# Patient Record
Sex: FEMALE | Race: WHITE | NOT HISPANIC OR LATINO | Employment: UNEMPLOYED | URBAN - NONMETROPOLITAN AREA
[De-identification: names, ages, dates, MRNs, and addresses within clinical notes are randomized per-mention and may not be internally consistent; named-entity substitution may affect disease eponyms.]

---

## 2017-05-17 ENCOUNTER — OUTSIDE FACILITY SERVICE (OUTPATIENT)
Dept: FAMILY MEDICINE CLINIC | Facility: CLINIC | Age: 82
End: 2017-05-17

## 2017-05-17 PROCEDURE — 99308 SBSQ NF CARE LOW MDM 20: CPT | Performed by: FAMILY MEDICINE

## 2017-05-24 ENCOUNTER — OUTSIDE FACILITY SERVICE (OUTPATIENT)
Dept: FAMILY MEDICINE CLINIC | Facility: CLINIC | Age: 82
End: 2017-05-24

## 2017-05-24 PROCEDURE — 99308 SBSQ NF CARE LOW MDM 20: CPT | Performed by: FAMILY MEDICINE

## 2017-06-06 ENCOUNTER — OUTSIDE FACILITY SERVICE (OUTPATIENT)
Dept: FAMILY MEDICINE CLINIC | Facility: CLINIC | Age: 82
End: 2017-06-06

## 2017-06-06 PROCEDURE — 99308 SBSQ NF CARE LOW MDM 20: CPT | Performed by: FAMILY MEDICINE

## 2017-06-09 ENCOUNTER — OUTSIDE FACILITY SERVICE (OUTPATIENT)
Dept: FAMILY MEDICINE CLINIC | Facility: CLINIC | Age: 82
End: 2017-06-09

## 2017-06-09 PROCEDURE — 99308 SBSQ NF CARE LOW MDM 20: CPT | Performed by: NURSE PRACTITIONER

## 2017-06-16 ENCOUNTER — OUTSIDE FACILITY SERVICE (OUTPATIENT)
Dept: FAMILY MEDICINE CLINIC | Facility: CLINIC | Age: 82
End: 2017-06-16

## 2017-06-16 PROCEDURE — 99308 SBSQ NF CARE LOW MDM 20: CPT | Performed by: NURSE PRACTITIONER

## 2017-06-20 ENCOUNTER — OUTSIDE FACILITY SERVICE (OUTPATIENT)
Dept: FAMILY MEDICINE CLINIC | Facility: CLINIC | Age: 82
End: 2017-06-20

## 2017-06-20 PROCEDURE — 99308 SBSQ NF CARE LOW MDM 20: CPT | Performed by: FAMILY MEDICINE

## 2017-06-28 ENCOUNTER — OUTSIDE FACILITY SERVICE (OUTPATIENT)
Dept: FAMILY MEDICINE CLINIC | Facility: CLINIC | Age: 82
End: 2017-06-28

## 2017-06-28 PROCEDURE — 99308 SBSQ NF CARE LOW MDM 20: CPT | Performed by: FAMILY MEDICINE

## 2017-07-07 ENCOUNTER — OUTSIDE FACILITY SERVICE (OUTPATIENT)
Dept: FAMILY MEDICINE CLINIC | Facility: CLINIC | Age: 82
End: 2017-07-07

## 2017-07-07 PROCEDURE — 99308 SBSQ NF CARE LOW MDM 20: CPT | Performed by: NURSE PRACTITIONER

## 2017-07-13 ENCOUNTER — OUTSIDE FACILITY SERVICE (OUTPATIENT)
Dept: FAMILY MEDICINE CLINIC | Facility: CLINIC | Age: 82
End: 2017-07-13

## 2017-07-13 PROCEDURE — 99309 SBSQ NF CARE MODERATE MDM 30: CPT | Performed by: NURSE PRACTITIONER

## 2017-07-27 ENCOUNTER — OUTSIDE FACILITY SERVICE (OUTPATIENT)
Dept: FAMILY MEDICINE CLINIC | Facility: CLINIC | Age: 82
End: 2017-07-27

## 2017-07-27 PROCEDURE — 99308 SBSQ NF CARE LOW MDM 20: CPT | Performed by: NURSE PRACTITIONER

## 2017-08-10 ENCOUNTER — OUTSIDE FACILITY SERVICE (OUTPATIENT)
Dept: FAMILY MEDICINE CLINIC | Facility: CLINIC | Age: 82
End: 2017-08-10

## 2017-08-10 PROCEDURE — 99308 SBSQ NF CARE LOW MDM 20: CPT | Performed by: NURSE PRACTITIONER

## 2017-08-29 ENCOUNTER — OUTSIDE FACILITY SERVICE (OUTPATIENT)
Dept: FAMILY MEDICINE CLINIC | Facility: CLINIC | Age: 82
End: 2017-08-29

## 2017-08-29 PROCEDURE — 99308 SBSQ NF CARE LOW MDM 20: CPT | Performed by: FAMILY MEDICINE

## 2017-09-21 ENCOUNTER — OUTSIDE FACILITY SERVICE (OUTPATIENT)
Dept: FAMILY MEDICINE CLINIC | Facility: CLINIC | Age: 82
End: 2017-09-21

## 2017-09-21 PROCEDURE — 99308 SBSQ NF CARE LOW MDM 20: CPT | Performed by: NURSE PRACTITIONER

## 2017-09-27 ENCOUNTER — OUTSIDE FACILITY SERVICE (OUTPATIENT)
Dept: FAMILY MEDICINE CLINIC | Facility: CLINIC | Age: 82
End: 2017-09-27

## 2017-09-27 PROCEDURE — 99309 SBSQ NF CARE MODERATE MDM 30: CPT | Performed by: FAMILY MEDICINE

## 2017-10-05 ENCOUNTER — OUTSIDE FACILITY SERVICE (OUTPATIENT)
Dept: FAMILY MEDICINE CLINIC | Facility: CLINIC | Age: 82
End: 2017-10-05

## 2017-10-05 PROCEDURE — 99308 SBSQ NF CARE LOW MDM 20: CPT | Performed by: NURSE PRACTITIONER

## 2017-10-10 ENCOUNTER — OUTSIDE FACILITY SERVICE (OUTPATIENT)
Dept: FAMILY MEDICINE CLINIC | Facility: CLINIC | Age: 82
End: 2017-10-10

## 2017-10-10 PROCEDURE — 99309 SBSQ NF CARE MODERATE MDM 30: CPT | Performed by: FAMILY MEDICINE

## 2017-10-19 ENCOUNTER — OUTSIDE FACILITY SERVICE (OUTPATIENT)
Dept: FAMILY MEDICINE CLINIC | Facility: CLINIC | Age: 82
End: 2017-10-19

## 2017-10-19 PROCEDURE — 99308 SBSQ NF CARE LOW MDM 20: CPT | Performed by: NURSE PRACTITIONER

## 2017-10-24 ENCOUNTER — OUTSIDE FACILITY SERVICE (OUTPATIENT)
Dept: FAMILY MEDICINE CLINIC | Facility: CLINIC | Age: 82
End: 2017-10-24

## 2017-10-24 PROCEDURE — 99309 SBSQ NF CARE MODERATE MDM 30: CPT | Performed by: FAMILY MEDICINE

## 2017-11-08 ENCOUNTER — OUTSIDE FACILITY SERVICE (OUTPATIENT)
Dept: FAMILY MEDICINE CLINIC | Facility: CLINIC | Age: 82
End: 2017-11-08

## 2017-11-08 PROCEDURE — 99309 SBSQ NF CARE MODERATE MDM 30: CPT | Performed by: FAMILY MEDICINE

## 2017-11-09 ENCOUNTER — OUTSIDE FACILITY SERVICE (OUTPATIENT)
Dept: FAMILY MEDICINE CLINIC | Facility: CLINIC | Age: 82
End: 2017-11-09

## 2017-11-09 PROCEDURE — 99308 SBSQ NF CARE LOW MDM 20: CPT | Performed by: NURSE PRACTITIONER

## 2017-12-05 ENCOUNTER — OUTSIDE FACILITY SERVICE (OUTPATIENT)
Dept: FAMILY MEDICINE CLINIC | Facility: CLINIC | Age: 82
End: 2017-12-05

## 2017-12-05 PROCEDURE — 99309 SBSQ NF CARE MODERATE MDM 30: CPT | Performed by: FAMILY MEDICINE

## 2017-12-19 ENCOUNTER — OUTSIDE FACILITY SERVICE (OUTPATIENT)
Dept: FAMILY MEDICINE CLINIC | Facility: CLINIC | Age: 82
End: 2017-12-19

## 2017-12-19 PROCEDURE — 99309 SBSQ NF CARE MODERATE MDM 30: CPT | Performed by: FAMILY MEDICINE

## 2017-12-21 ENCOUNTER — OUTSIDE FACILITY SERVICE (OUTPATIENT)
Dept: FAMILY MEDICINE CLINIC | Facility: CLINIC | Age: 82
End: 2017-12-21

## 2017-12-21 PROCEDURE — 99308 SBSQ NF CARE LOW MDM 20: CPT | Performed by: NURSE PRACTITIONER

## 2018-01-09 ENCOUNTER — OUTSIDE FACILITY SERVICE (OUTPATIENT)
Dept: INTERNAL MEDICINE | Facility: CLINIC | Age: 83
End: 2018-01-09

## 2018-01-09 PROCEDURE — 99309 SBSQ NF CARE MODERATE MDM 30: CPT | Performed by: FAMILY MEDICINE

## 2018-01-18 ENCOUNTER — OUTSIDE FACILITY SERVICE (OUTPATIENT)
Dept: INTERNAL MEDICINE | Facility: CLINIC | Age: 83
End: 2018-01-18

## 2018-01-18 ENCOUNTER — OUTSIDE FACILITY SERVICE (OUTPATIENT)
Dept: FAMILY MEDICINE CLINIC | Facility: CLINIC | Age: 83
End: 2018-01-18

## 2018-01-18 PROCEDURE — 99308 SBSQ NF CARE LOW MDM 20: CPT | Performed by: NURSE PRACTITIONER

## 2018-01-18 PROCEDURE — 99309 SBSQ NF CARE MODERATE MDM 30: CPT | Performed by: FAMILY MEDICINE

## 2018-01-31 ENCOUNTER — OUTSIDE FACILITY SERVICE (OUTPATIENT)
Dept: INTERNAL MEDICINE | Facility: CLINIC | Age: 83
End: 2018-01-31

## 2018-01-31 PROCEDURE — 99309 SBSQ NF CARE MODERATE MDM 30: CPT | Performed by: FAMILY MEDICINE

## 2018-02-13 ENCOUNTER — OUTSIDE FACILITY SERVICE (OUTPATIENT)
Dept: INTERNAL MEDICINE | Facility: CLINIC | Age: 83
End: 2018-02-13

## 2018-02-13 PROCEDURE — 99309 SBSQ NF CARE MODERATE MDM 30: CPT | Performed by: FAMILY MEDICINE

## 2018-02-27 ENCOUNTER — OUTSIDE FACILITY SERVICE (OUTPATIENT)
Dept: INTERNAL MEDICINE | Facility: CLINIC | Age: 83
End: 2018-02-27

## 2018-02-27 PROCEDURE — 99309 SBSQ NF CARE MODERATE MDM 30: CPT | Performed by: FAMILY MEDICINE

## 2018-03-07 ENCOUNTER — OUTSIDE FACILITY SERVICE (OUTPATIENT)
Dept: INTERNAL MEDICINE | Facility: CLINIC | Age: 83
End: 2018-03-07

## 2018-03-07 PROCEDURE — 99309 SBSQ NF CARE MODERATE MDM 30: CPT | Performed by: FAMILY MEDICINE

## 2018-03-27 ENCOUNTER — OUTSIDE FACILITY SERVICE (OUTPATIENT)
Dept: INTERNAL MEDICINE | Facility: CLINIC | Age: 83
End: 2018-03-27

## 2018-03-27 PROCEDURE — 99309 SBSQ NF CARE MODERATE MDM 30: CPT | Performed by: FAMILY MEDICINE

## 2018-03-29 ENCOUNTER — OUTSIDE FACILITY SERVICE (OUTPATIENT)
Dept: FAMILY MEDICINE CLINIC | Facility: CLINIC | Age: 83
End: 2018-03-29

## 2018-03-29 PROCEDURE — 99308 SBSQ NF CARE LOW MDM 20: CPT | Performed by: NURSE PRACTITIONER

## 2018-04-05 ENCOUNTER — OUTSIDE FACILITY SERVICE (OUTPATIENT)
Dept: FAMILY MEDICINE CLINIC | Facility: CLINIC | Age: 83
End: 2018-04-05

## 2018-04-05 PROCEDURE — 99308 SBSQ NF CARE LOW MDM 20: CPT | Performed by: NURSE PRACTITIONER

## 2018-04-10 ENCOUNTER — OUTSIDE FACILITY SERVICE (OUTPATIENT)
Dept: INTERNAL MEDICINE | Facility: CLINIC | Age: 83
End: 2018-04-10

## 2018-04-10 PROCEDURE — 99309 SBSQ NF CARE MODERATE MDM 30: CPT | Performed by: FAMILY MEDICINE

## 2018-04-17 ENCOUNTER — OUTSIDE FACILITY SERVICE (OUTPATIENT)
Dept: INTERNAL MEDICINE | Facility: CLINIC | Age: 83
End: 2018-04-17

## 2018-04-17 PROCEDURE — 99309 SBSQ NF CARE MODERATE MDM 30: CPT | Performed by: FAMILY MEDICINE

## 2018-04-24 ENCOUNTER — OUTSIDE FACILITY SERVICE (OUTPATIENT)
Dept: INTERNAL MEDICINE | Facility: CLINIC | Age: 83
End: 2018-04-24

## 2018-04-24 PROCEDURE — 99309 SBSQ NF CARE MODERATE MDM 30: CPT | Performed by: FAMILY MEDICINE

## 2018-05-03 ENCOUNTER — OUTSIDE FACILITY SERVICE (OUTPATIENT)
Dept: FAMILY MEDICINE CLINIC | Facility: CLINIC | Age: 83
End: 2018-05-03

## 2018-05-03 PROCEDURE — 99309 SBSQ NF CARE MODERATE MDM 30: CPT | Performed by: NURSE PRACTITIONER

## 2018-05-10 ENCOUNTER — OUTSIDE FACILITY SERVICE (OUTPATIENT)
Dept: FAMILY MEDICINE CLINIC | Facility: CLINIC | Age: 83
End: 2018-05-10

## 2018-05-10 PROCEDURE — 99308 SBSQ NF CARE LOW MDM 20: CPT | Performed by: NURSE PRACTITIONER

## 2018-05-13 ENCOUNTER — HOSPITAL ENCOUNTER (EMERGENCY)
Facility: HOSPITAL | Age: 83
Discharge: NURSING, REHAB OR LONG-TERM CARE | End: 2018-05-13
Attending: EMERGENCY MEDICINE | Admitting: EMERGENCY MEDICINE

## 2018-05-13 ENCOUNTER — APPOINTMENT (OUTPATIENT)
Dept: CT IMAGING | Facility: HOSPITAL | Age: 83
End: 2018-05-13

## 2018-05-13 VITALS
OXYGEN SATURATION: 97 % | RESPIRATION RATE: 18 BRPM | HEIGHT: 61 IN | SYSTOLIC BLOOD PRESSURE: 121 MMHG | HEART RATE: 81 BPM | BODY MASS INDEX: 23.79 KG/M2 | TEMPERATURE: 96.8 F | DIASTOLIC BLOOD PRESSURE: 74 MMHG | WEIGHT: 126 LBS
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
## 2018-05-13 DIAGNOSIS — S00.93XA CONTUSION OF HEAD, UNSPECIFIED PART OF HEAD, INITIAL ENCOUNTER: Primary | ICD-10-CM

## 2018-05-13 PROCEDURE — 99284 EMERGENCY DEPT VISIT MOD MDM: CPT

## 2018-05-13 PROCEDURE — 70450 CT HEAD/BRAIN W/O DYE: CPT

## 2018-05-13 RX ORDER — HYDROCODONE BITARTRATE AND ACETAMINOPHEN 5; 325 MG/1; MG/1
0.5 TABLET ORAL 2 TIMES DAILY
COMMUNITY
End: 2018-06-27 | Stop reason: SDUPTHER

## 2018-05-13 RX ORDER — ACETAMINOPHEN 500 MG
500 TABLET ORAL ONCE
Status: COMPLETED | OUTPATIENT
Start: 2018-05-13 | End: 2018-05-13

## 2018-05-13 RX ORDER — DILTIAZEM HYDROCHLORIDE 180 MG/1
180 CAPSULE, COATED, EXTENDED RELEASE ORAL DAILY
COMMUNITY

## 2018-05-13 RX ORDER — TAMSULOSIN HYDROCHLORIDE 0.4 MG/1
1 CAPSULE ORAL DAILY
COMMUNITY

## 2018-05-13 RX ORDER — RANITIDINE 150 MG/1
150 TABLET ORAL NIGHTLY
COMMUNITY

## 2018-05-13 RX ORDER — TROLAMINE SALICYLATE 10 G/100G
1 CREAM TOPICAL 4 TIMES DAILY
COMMUNITY

## 2018-05-13 RX ORDER — ASPIRIN 81 MG/1
81 TABLET, CHEWABLE ORAL DAILY
COMMUNITY

## 2018-05-13 RX ORDER — TRAZODONE HYDROCHLORIDE 50 MG/1
50 TABLET ORAL NIGHTLY
COMMUNITY

## 2018-05-13 RX ORDER — BUSPIRONE HYDROCHLORIDE 5 MG/1
5 TABLET ORAL 2 TIMES DAILY
COMMUNITY

## 2018-05-13 RX ORDER — BIOTIN 1000 MCG
1 TABLET,CHEWABLE ORAL DAILY
COMMUNITY

## 2018-05-13 RX ORDER — SENNOSIDES 8.6 MG
TABLET ORAL 2 TIMES DAILY
COMMUNITY

## 2018-05-13 RX ORDER — LEVOTHYROXINE SODIUM 0.05 MG/1
50 TABLET ORAL DAILY
COMMUNITY

## 2018-05-13 RX ORDER — LORATADINE 10 MG/1
1 CAPSULE, LIQUID FILLED ORAL NIGHTLY
COMMUNITY

## 2018-05-13 RX ORDER — IPRATROPIUM BROMIDE AND ALBUTEROL SULFATE 2.5; .5 MG/3ML; MG/3ML
3 SOLUTION RESPIRATORY (INHALATION) EVERY 6 HOURS
COMMUNITY

## 2018-05-13 RX ORDER — FUROSEMIDE 20 MG/1
20 TABLET ORAL EVERY OTHER DAY
COMMUNITY

## 2018-05-13 RX ORDER — MOMETASONE FUROATE 1 MG/G
1 OINTMENT TOPICAL DAILY
COMMUNITY

## 2018-05-13 RX ORDER — MELATONIN
2000 DAILY
COMMUNITY

## 2018-05-13 RX ADMIN — ACETAMINOPHEN 500 MG: 500 TABLET, FILM COATED ORAL at 16:06

## 2018-05-13 NOTE — ED PROVIDER NOTES
Subjective   88-year-old female fell forward from her wheelchair hitting her head on the ground.  She arrived via EMS to be evaluated.  No loss of consciousness.  She's complaining of a headache.  She is a poor historian unable to give a reliable history but does report that she did fall out of her wheelchair.  She denies any chest pain shortness of breath        History provided by:  Patient and relative   used: No        Review of Systems   Constitutional:        Fall   Neurological: Positive for headaches.   All other systems reviewed and are negative.      Past Medical History:   Diagnosis Date   • Atrial fibrillation    • COPD (chronic obstructive pulmonary disease)    • Hypertension        Allergies   Allergen Reactions   • Macrobid [Nitrofurantoin Macrocrystal] Unknown (See Comments)     unknown       Past Surgical History:   Procedure Laterality Date   • HYSTERECTOMY         History reviewed. No pertinent family history.    Social History     Social History   • Marital status:      Social History Main Topics   • Smoking status: Never Smoker   • Alcohol use No   • Drug use: No     Other Topics Concern   • Not on file           Objective   Physical Exam   Constitutional: She appears well-developed and well-nourished.   Eyes: EOM are normal.   Neck: Normal range of motion. Neck supple.   Cardiovascular: Normal rate and regular rhythm.    Pulmonary/Chest: Effort normal and breath sounds normal.   Abdominal: Soft. Bowel sounds are normal.   Musculoskeletal: Normal range of motion.   Neurological: She is alert.   Skin: Skin is warm.   Contusion on left side of forehead   Psychiatric: She has a normal mood and affect. Her behavior is normal.   Nursing note and vitals reviewed.      Procedures           ED Course  ED Course                  MDM      Final diagnoses:   Contusion of head, unspecified part of head, initial encounter   Fall, initial encounter            Harjinder Matthews Jr.,  JENNIFER  05/13/18 1635

## 2018-05-13 NOTE — ED NOTES
Dispatch called for transport back to [facility]      Dede Walls  05/13/18 [id number]

## 2018-05-24 ENCOUNTER — OUTSIDE FACILITY SERVICE (OUTPATIENT)
Dept: FAMILY MEDICINE CLINIC | Facility: CLINIC | Age: 83
End: 2018-05-24

## 2018-05-24 PROCEDURE — 99308 SBSQ NF CARE LOW MDM 20: CPT | Performed by: NURSE PRACTITIONER

## 2018-06-06 ENCOUNTER — OUTSIDE FACILITY SERVICE (OUTPATIENT)
Dept: FAMILY MEDICINE CLINIC | Facility: CLINIC | Age: 83
End: 2018-06-06

## 2018-06-06 PROCEDURE — 99309 SBSQ NF CARE MODERATE MDM 30: CPT | Performed by: NURSE PRACTITIONER

## 2018-06-27 RX ORDER — HYDROCODONE BITARTRATE AND ACETAMINOPHEN 5; 325 MG/1; MG/1
0.5 TABLET ORAL 2 TIMES DAILY
Qty: 60 TABLET | Refills: 0 | Status: SHIPPED | OUTPATIENT
Start: 2018-06-27 | End: 2018-11-29 | Stop reason: SDUPTHER

## 2018-07-09 ENCOUNTER — NURSING HOME (OUTPATIENT)
Dept: FAMILY MEDICINE CLINIC | Facility: CLINIC | Age: 83
End: 2018-07-09

## 2018-07-09 DIAGNOSIS — J42 CHRONIC BRONCHITIS, UNSPECIFIED CHRONIC BRONCHITIS TYPE (HCC): Chronic | ICD-10-CM

## 2018-07-09 DIAGNOSIS — I48.20 CHRONIC ATRIAL FIBRILLATION (HCC): Chronic | ICD-10-CM

## 2018-07-09 DIAGNOSIS — F01.518 VASCULAR DEMENTIA WITH BEHAVIOR DISTURBANCE (HCC): Primary | Chronic | ICD-10-CM

## 2018-07-09 DIAGNOSIS — I10 ESSENTIAL HYPERTENSION: Chronic | ICD-10-CM

## 2018-07-09 PROBLEM — J44.9 COPD (CHRONIC OBSTRUCTIVE PULMONARY DISEASE) (HCC): Chronic | Status: ACTIVE | Noted: 2018-07-09

## 2018-07-09 PROBLEM — Z78.9 IMPAIRED MOBILITY AND ADLS: Chronic | Status: ACTIVE | Noted: 2018-07-09

## 2018-07-09 PROBLEM — Z74.09 IMPAIRED MOBILITY AND ADLS: Chronic | Status: ACTIVE | Noted: 2018-07-09

## 2018-07-09 PROBLEM — F01.50 VASCULAR DEMENTIA (HCC): Chronic | Status: ACTIVE | Noted: 2018-07-09

## 2018-07-09 PROCEDURE — 99309 SBSQ NF CARE MODERATE MDM 30: CPT | Performed by: FAMILY MEDICINE

## 2018-07-09 NOTE — PROGRESS NOTES
[facility]  Skilled Nursing Facility    Patient Name: Jaleesa Marinelli    : 1929     DOS: 2018    Nursing Facility: []   [facility]  []   [facility]  []   [facility]  []   [facility]    Subjective     Chief Complaint:  CMM/LTC    History of Present Illness:   [x]  Follow Up visit for coordination of long term care issues and chronic medical management needs.    Patient has had a decline with overall behaviors, including increased anxiety and agitation at bonnie decreased appetite over the course of the last 4-5 days additionally.  No recent changes to her edications with exception of recently started on long-acting benzodiazepine therapy that initially provided significant stabilization to her anxiety/mood instability.    Review of Systems:  [x]  A complete ROS was performed. All were (-).  [x]  A complete ROS was performed. All were (-) except:     Review of Systems    1. Constitutional:  2. HENT:  3. Eyes:  4. Respiratory:  5. Cardiovascular:  6. Gastrointestinal:  7. Endocrine:  8. Genitourinary:  9. Musculoskeletal:  10. Skin:  11. Neurological:  12. Hematological:  13. Psychiatric/Behavioral: increased anxiety and decreased appetite    Past Medical History:   Past Medical History:   Diagnosis Date   • Atrial fibrillation (CMS/HCC)    • COPD (chronic obstructive pulmonary disease) (CMS/[facility])    • Hypertension     Reviewed at facility    Past Surgical History:  Past Surgical History:   Procedure Laterality Date   • HYSTERECTOMY      Reviewed at facility    Family History: family history is not on file. Reviewed at facility    Social History:  reports that she has never smoked. She does not have any smokeless tobacco history on file. She reports that she does not drink alcohol or use drugs. Reviewed at facility    Allergies:  Allergies   Allergen Reactions   • Macrobid [Nitrofurantoin Macrocrystal] Unknown (See Comments)     unknown    Reviewed at facility, no new listings     [x]  History reviewed at  skilled nursing facility.    Objective     Vital Signs:                                              LPM: ____  Weight: _____    Physical Exam:  General:   [x]  Alert   [x]  Oriented x 3  [x]  No acute distress    []  Confused  []  Disoriented   []  Comatose      HEENT:   [x] Atraumatic  [x]  PEERLA  [x]  Oral mucosa pink and moist                Neck:      [x]  Nares patent without epistaxis  [x]  External auditory canals are patent     [x]  Tympanic membranes intact      [x]  Supple [x]  No JVD [x]  Trachea midline [x]  No thyromegaly      Psych:  [x]  Mood _________  []  No acute changes []  Depressed     Heart::   [x]  RRR  []  IRRR  []  Murmur ___________     Pedal Pulses:    [x]  Present  []  Absent      Lungs:   [x]   CTA  [x]  Unlabored breathing effort []  Rales, Location ___________       Abdomen:    [x]   Soft, BS x 4, NT/ND  []  Distended  []  Tender __________       Skin:  [x]  Intact, warm and dry  []  Pressure Ulcer, Location _______________     Extremities:               Neuro:     [x]  No clubbing [x]  No cyanosis  [x]  No edema    [x]  Good ROM actively and passively [x] Symmetric muscle strength and                                                                 development     []  Edema, Location _______________  []  Pitting    [x]  Normal speech [x]  Normal mental status [x] Cranial nerves II through                                                                                XII intact   [x]  No anosmia [x]  DTR 2+ [x]  Proprioception intact    [x]  No focal motor/sensory deficits       Pain:  []  None  [x]  Pain noted w/pain management in place       Urinary:                []  Continent  []  Incontinent  []  Retention  []  F/C     []  UTI w/treatment in progress              Appetite:  [x]  Fair  []  Good  []  Poor  [x]  Weight Stable     []  Weightloss  []  Unavoidable Weight Loss     []  Supplements Provided  []  Tolerating Tube Feeding              Assessment / Plan      Assessment/Problem List:    Patient Active Problem List   Diagnosis   • Vascular dementia   • Essential hypertension   • COPD (chronic obstructive pulmonary disease) (CMS/HCC)   • Chronic atrial fibrillation (CMS/HCC)   • Impaired mobility and ADLs       Plan:  Plan to discontinuation of long-acting benzodiazepine.  Start low-dose risperidone 3 times daily scheduled dong.  Plan to follow clinically for response tn regimen. Heart rate appears well controlled at present.  No signs of acute COPD exacerbation.  Continue supportive care as needed.      [x] Medication Review: I have reviewed the patients active and prn medications at [facility]      []   PT/OT Reviewed   [x]  Order Changes     [x] I have personally reviewed and interpreted available lab data, radiology studies and ECG obtained at time of visit.       []   Discharge Plans Reviewed      [x]  Plan of Care Reviewed  []  Discharge Summary Reviewed      [x]  Discussed Patient in detail with nursing/staff, addressed all needs today.    Discussed plan of care in detail with pt today; pt verb understanding and agrees; counseled for approx 15 min of total 25 min total exam time.    Tony Marc,  07/09/18 1:00 PM    EMR Dragon/Transcription disclaimer:   Much of this encounter note is an electronic transcription/translation of spoken language to printed text. The electronic translation of spoken language may permit erroneous, or at times, nonsensical words or phrases to be inadvertently transcribed; Although I have reviewed the note for such errors, some may still exist.

## 2018-07-16 ENCOUNTER — NURSING HOME (OUTPATIENT)
Dept: FAMILY MEDICINE CLINIC | Facility: CLINIC | Age: 83
End: 2018-07-16

## 2018-07-16 DIAGNOSIS — Z78.9 IMPAIRED MOBILITY AND ADLS: Chronic | ICD-10-CM

## 2018-07-16 DIAGNOSIS — F01.518 VASCULAR DEMENTIA WITH BEHAVIOR DISTURBANCE (HCC): Primary | Chronic | ICD-10-CM

## 2018-07-16 DIAGNOSIS — I48.20 CHRONIC ATRIAL FIBRILLATION (HCC): Chronic | ICD-10-CM

## 2018-07-16 DIAGNOSIS — I10 ESSENTIAL HYPERTENSION: Chronic | ICD-10-CM

## 2018-07-16 DIAGNOSIS — Z74.09 IMPAIRED MOBILITY AND ADLS: Chronic | ICD-10-CM

## 2018-07-16 DIAGNOSIS — J41.0 SIMPLE CHRONIC BRONCHITIS (HCC): Chronic | ICD-10-CM

## 2018-07-16 PROCEDURE — 99309 SBSQ NF CARE MODERATE MDM 30: CPT | Performed by: FAMILY MEDICINE

## 2018-07-18 NOTE — PROGRESS NOTES
Nursing Home Progress Note      Patient Name: Jaleesa Marinelli   YOB: 1929    Date of Service: 07/16/2018      Facility: [place] []   [place] []   [facility] []   [facility] [x]   Other []       CHIEF COMPLAINT:  CMM/LTC     HISTORY OF PRESENT ILLNESS:  [x]  Follow Up visit for coordination of long term care issues and chronic medical management needs.    Patient has shown significant improvement to her frequency and severity of anxiety episodes since initiation of risperidone.  Continued further adjustments to her medication regimen have been planned based on her response to initial changes.    PAST MEDICAL & SURGICAL HISTORY:  Past Medical History:   Diagnosis Date   • Atrial fibrillation (CMS/HCC)    • COPD (chronic obstructive pulmonary disease) (CMS/[facility])    • Hypertension       Past Surgical History:   Procedure Laterality Date   • HYSTERECTOMY          MEDICATIONS:  Medication administration record for Jaleesa Marinelli reviewed and reconciled today    ALLERGIES:  Allergies   Allergen Reactions   • Macrobid [Nitrofurantoin Macrocrystal] Unknown (See Comments)     unknown        REVIEW OF SYSTEMS:  • Appetite: Fair [x]   Good []   Poor []   Weight Loss []  [x]  Weight Stable   Unavoidable Weight Loss []  Tolerating Tube Feeding []    Supplements Provided []   • Constitutional: Negative for fever, chills, diaphoresis or fatigue and weight change.   • HENT: No dysphagia; no changes to vision/hearing/smell/taste; no epistaxis  • Eyes: Negative for redness and visual disturbance.   • Respiratory: Negative for shortness of breath, chest pain, cough or chest tightness.   • Cardiovascular: Negative for chest pain and palpitations.   • Gastrointestinal: Negative for abdominal distention, abdominal pain and blood in stool.   • Endocrine: Negative for cold intolerance and heat intolerance.   • Genitourinary: Negative for difficulty urinating, dysuria and frequency.Negative for hematuria   • Musculoskeletal: Chronic  myalgias and arthralgias  • Integumentary: No open wounds, rash or concerning skin lesions  • Neurological: Negative for syncope, weakness and headaches.   • Hematological: Negative for adenopathy. Does not bruise/bleed easily.   • Immunological: Negative for reported allergies or immunological disorders  • Psychological: Occasional increased anxiety    PHYSICAL EXAMINATION:  VITAL SIGNS: /70, HR 66 BPM, RR 16, weight 127 pounds    General Appearance:  [x]  Alert   [x]  Oriented x person  [x]  No acute distress, chronically ill-appearing female    [x]  Confused  []  Disoriented   []  Comatose   Head:  Atraumatic and normocephalic, without obvious abnormality.   Eyes:          PERRLA, conjunctivae and sclerae normal, no Icterus. No pallor. Extra-occular movements are within normal limits.   Ears:  Ears appear intact with no abnormalities noted.   Throat: No oral lesions, no thrush, oral mucosa moist.   Neck: Supple, trachea midline, no thyromegaly, no carotid bruit.   Back:   No kyphoscoliosis. No tenderness to palpation.   Lungs:   Chest shape is normal. Breath sounds heard bilaterally equally.  No wheezing.  Bilateral basal crackles heard.    Heart:  Normal S1 and S2, no murmur, no gallop, no rub. No JVD.   Abdomen:   Normal bowel sounds, no masses, no organomegaly. Soft, non-tender, non-distended, no guarding    Extremities: Moves all extremities well, edema, cyanosis or clubbing.  Frail build.    Pulses: Pulses palpable and equal bilaterally.   Skin: No bleeding or rash.  Generalized dry skin noted.  Age-related atrophy of skin.   Neurologic: [x] Normal speech []  Normal mental status    [x] Cranial nerves II through XII intact   [x]  No anosmia [x]  DTR 2+ [x]  Proprioception intact  []  No focal motor/sensory deficits     Psych/Mood:        [x]  No acute changes []  Depressed  Urinary:        []  Continent  [x]  Incontinent  []  Retention  []  F/C     []  UTI w/treatment in progress  RECORD REVIEW:    • Consult notes []   • Admission and subsequent notes []   •  [x] I have personally reviewed and interpreted available lab data, radiology studies and ECG obtained at time of visit.  [x] Medication Review: I have reviewed the patients active and prn medications at Skilled Nursing Facility     ASSESSMENT   Diagnoses and all orders for this visit:    Vascular dementia with behavior disturbance    Simple chronic bronchitis (CMS/HCC)    Essential hypertension    Chronic atrial fibrillation (CMS/HCC)    Impaired mobility and ADLs    •     PLAN  Planned continuation of supportive care as needed for ADLs.  We'll continue risperidone at twice daily dosing.  Patient's outburst of increased anxiety seem to have decreased in frequency and severity.  Consideration of reduction in dosing to risperidone if patient becomes too sedated/somnolent.  Blood pressure is at goal, heart rate well controlled.  Continue other medications at current dosing and frequency.    [x]  Discussed Patient in detail with nursing/staff, addressed all needs today.     [x]  Plan of Care Reviewed   []   PT/OT Reviewed   []  Order Changes  []   Discharge Plans Reviewed         Total face to face time spent with patient 25 minutes, 15 min in counseling.    Tony Marc DO.  7/18/2018

## 2018-07-23 ENCOUNTER — NURSING HOME (OUTPATIENT)
Dept: FAMILY MEDICINE CLINIC | Facility: CLINIC | Age: 83
End: 2018-07-23

## 2018-07-23 DIAGNOSIS — Z78.9 IMPAIRED MOBILITY AND ADLS: Chronic | ICD-10-CM

## 2018-07-23 DIAGNOSIS — I10 ESSENTIAL HYPERTENSION: Chronic | ICD-10-CM

## 2018-07-23 DIAGNOSIS — R54 AGE-RELATED PHYSICAL DEBILITY: Chronic | ICD-10-CM

## 2018-07-23 DIAGNOSIS — J42 CHRONIC BRONCHITIS, UNSPECIFIED CHRONIC BRONCHITIS TYPE (HCC): Chronic | ICD-10-CM

## 2018-07-23 DIAGNOSIS — I48.20 CHRONIC ATRIAL FIBRILLATION (HCC): Chronic | ICD-10-CM

## 2018-07-23 DIAGNOSIS — F01.518 VASCULAR DEMENTIA WITH BEHAVIOR DISTURBANCE (HCC): Primary | Chronic | ICD-10-CM

## 2018-07-23 DIAGNOSIS — Z74.09 IMPAIRED MOBILITY AND ADLS: Chronic | ICD-10-CM

## 2018-07-23 PROCEDURE — 99309 SBSQ NF CARE MODERATE MDM 30: CPT | Performed by: FAMILY MEDICINE

## 2018-07-25 PROBLEM — R54 AGE-RELATED PHYSICAL DEBILITY: Chronic | Status: ACTIVE | Noted: 2018-07-25

## 2018-07-25 NOTE — PROGRESS NOTES
Nursing Home Progress Note      Patient Name: Jaleesa Marinelli   YOB: 1929    Date of Service: 07/23/2018      Facility: [facility] []   [facility] []   [facility] []   [facility] [x]   Other []       CHIEF COMPLAINT:  CMM/LTC     HISTORY OF PRESENT ILLNESS:  [x]  Follow Up visit for coordination of long term care issues and chronic medical management needs.    Vascular dementia with behavioral disturbance/impaired mobility and ADLs/age-related physical debility/chronic A. fib/COPD/hypertension    PAST MEDICAL & SURGICAL HISTORY:  Past Medical History:   Diagnosis Date   • Atrial fibrillation (CMS/HCC)    • COPD (chronic obstructive pulmonary disease) (CMS/HCC)    • Hypertension       Past Surgical History:   Procedure Laterality Date   • HYSTERECTOMY          MEDICATIONS:  Medication administration record for Jaleesa Marinelli reviewed and reconciled today    ALLERGIES:  Allergies   Allergen Reactions   • Macrobid [Nitrofurantoin Macrocrystal] Unknown (See Comments)     unknown        REVIEW OF SYSTEMS:  • Appetite: Fair [x]   Good []   Poor []   Weight Loss []  [x]  Weight Stable   Unavoidable Weight Loss []  Tolerating Tube Feeding []    Supplements Provided []   • Constitutional: Negative for fever, chills, diaphoresis or fatigue and weight change.   • HENT: No dysphagia; no changes to vision/hearing/smell/taste; no epistaxis  • Eyes: Negative for redness and visual disturbance.   • Respiratory: Negative for shortness of breath, chest pain, cough or chest tightness.   • Cardiovascular: Negative for chest pain and palpitations.   • Gastrointestinal: Negative for abdominal distention, abdominal pain and blood in stool.   • Endocrine: Negative for cold intolerance and heat intolerance.   • Genitourinary: Negative for difficulty urinating, dysuria and frequency.Negative for hematuria   • Musculoskeletal: Chronic myalgias and arthralgias  • Integumentary: No open wounds, rash or concerning skin lesions  • Neurological:  Negative for syncope, weakness and headaches.   • Hematological: Negative for adenopathy. Does not bruise/bleed easily.   • Immunological: Negative for reported allergies or immunological disorders  • Psychological: Occasional increased anxiety    PHYSICAL EXAMINATION:  VITAL SIGNS: /72, HR 86 bpm, RR 18, weight 127    General Appearance:  [x]  Alert   [x]  Oriented x person  [x]  No acute distress, chronically ill-appearing female    [x]  Confused  []  Disoriented   []  Comatose   Head:  Atraumatic and normocephalic, without obvious abnormality.   Eyes:          PERRLA, conjunctivae and sclerae normal, no Icterus. No pallor. Extra-occular movements are within normal limits.   Ears:  Ears appear intact with no abnormalities noted.   Throat: No oral lesions, no thrush, oral mucosa moist.   Neck: Supple, trachea midline, no thyromegaly, no carotid bruit.   Back:   No kyphoscoliosis. No tenderness to palpation.   Lungs:   Chest shape is normal. Breath sounds heard bilaterally equally.  No wheezing.  Bilateral basal crackles heard.    Heart:  Normal S1 and S2, no murmur, no gallop, no rub. No JVD.   Abdomen:   Normal bowel sounds, no masses, no organomegaly. Soft, non-tender, non-distended, no guarding    Extremities: Moves all extremities well, edema, cyanosis or clubbing.  Frail build.    Pulses: Pulses palpable and equal bilaterally.   Skin: No bleeding or rash.  Generalized dry skin noted.  Age-related atrophy of skin.   Neurologic: [x] Normal speech []  Normal mental status    [x] Cranial nerves II through XII intact   [x]  No anosmia [x]  DTR 2+ [x]  Proprioception intact  []  No focal motor/sensory deficits     Psych/Mood:        [x]  No acute changes []  Depressed  Urinary:        []  Continent  [x]  Incontinent  []  Retention  []  F/C     []  UTI w/treatment in progress  RECORD REVIEW:   • Consult notes []   • Admission and subsequent notes []   •  [x] I have personally reviewed and interpreted available  lab data, radiology studies and ECG obtained at time of visit.  [x] Medication Review: I have reviewed the patients active and prn medications at Skilled Nursing Facility     ASSESSMENT   Diagnoses and all orders for this visit:    Vascular dementia with behavior disturbance    Impaired mobility and ADLs    Chronic bronchitis, unspecified chronic bronchitis type (CMS/HCC)    Chronic atrial fibrillation (CMS/HCC)    Essential hypertension    Age-related physical debility        PLAN  Continue supportive care as needed for ADLs, as well as mobilization.  Continue risperidone at twice daily dosing.  Patient's outburst of increased anxiety continues to have decreased in frequency and severity.  Consider need for reduction in dosing to risperidone if patient becomes too sedated/somnolent.  Blood pressure is at goal, heart rate well controlled on review of vital signs.  Continue other medications at current dosing and frequency.  Surveillance labs as needed.    [x]  Discussed Patient in detail with nursing/staff, addressed all needs today.     [x]  Plan of Care Reviewed   []   PT/OT Reviewed   []  Order Changes  []   Discharge Plans Reviewed         Total face to face time spent with patient 25 minutes, 15 min in counseling.    Tony Marc DO.  7/25/2018

## 2018-08-01 ENCOUNTER — LAB REQUISITION (OUTPATIENT)
Dept: LAB | Facility: HOSPITAL | Age: 83
End: 2018-08-01

## 2018-08-01 DIAGNOSIS — Z00.00 ROUTINE GENERAL MEDICAL EXAMINATION AT A HEALTH CARE FACILITY: ICD-10-CM

## 2018-08-01 LAB
BASOPHILS # BLD AUTO: 0.03 10*3/MM3 (ref 0–0.2)
BASOPHILS NFR BLD AUTO: 0.4 % (ref 0–1)
DEPRECATED RDW RBC AUTO: 48.8 FL (ref 37–54)
EOSINOPHIL # BLD AUTO: 0.18 10*3/MM3 (ref 0–0.3)
EOSINOPHIL NFR BLD AUTO: 2.3 % (ref 0–3)
ERYTHROCYTE [DISTWIDTH] IN BLOOD BY AUTOMATED COUNT: 13.9 % (ref 11.3–14.5)
HCT VFR BLD AUTO: 37.6 % (ref 34.5–44)
HGB BLD-MCNC: 11.6 G/DL (ref 11.5–15.5)
IMM GRANULOCYTES # BLD: 0.01 10*3/MM3 (ref 0–0.03)
IMM GRANULOCYTES NFR BLD: 0.1 % (ref 0–0.6)
LYMPHOCYTES # BLD AUTO: 2.19 10*3/MM3 (ref 0.6–4.8)
LYMPHOCYTES NFR BLD AUTO: 28.4 % (ref 24–44)
MCH RBC QN AUTO: 29.7 PG (ref 27–31)
MCHC RBC AUTO-ENTMCNC: 30.9 G/DL (ref 32–36)
MCV RBC AUTO: 96.2 FL (ref 80–99)
MONOCYTES # BLD AUTO: 0.69 10*3/MM3 (ref 0–1)
MONOCYTES NFR BLD AUTO: 8.9 % (ref 0–12)
NEUTROPHILS # BLD AUTO: 4.62 10*3/MM3 (ref 1.5–8.3)
NEUTROPHILS NFR BLD AUTO: 60 % (ref 41–71)
PLATELET # BLD AUTO: 270 10*3/MM3 (ref 150–450)
PMV BLD AUTO: 10.2 FL (ref 6–12)
RBC # BLD AUTO: 3.91 10*6/MM3 (ref 3.89–5.14)
WBC NRBC COR # BLD: 7.71 10*3/MM3 (ref 3.5–10.8)

## 2018-08-01 PROCEDURE — 85025 COMPLETE CBC W/AUTO DIFF WBC: CPT

## 2018-08-08 ENCOUNTER — NURSING HOME (OUTPATIENT)
Dept: FAMILY MEDICINE CLINIC | Facility: CLINIC | Age: 83
End: 2018-08-08

## 2018-08-08 DIAGNOSIS — I48.20 CHRONIC ATRIAL FIBRILLATION (HCC): Chronic | ICD-10-CM

## 2018-08-08 DIAGNOSIS — I10 ESSENTIAL HYPERTENSION: Chronic | ICD-10-CM

## 2018-08-08 DIAGNOSIS — F01.518 VASCULAR DEMENTIA WITH BEHAVIOR DISTURBANCE (HCC): Primary | Chronic | ICD-10-CM

## 2018-08-08 DIAGNOSIS — Z78.9 IMPAIRED MOBILITY AND ADLS: Chronic | ICD-10-CM

## 2018-08-08 DIAGNOSIS — J42 CHRONIC BRONCHITIS, UNSPECIFIED CHRONIC BRONCHITIS TYPE (HCC): Chronic | ICD-10-CM

## 2018-08-08 DIAGNOSIS — Z74.09 IMPAIRED MOBILITY AND ADLS: Chronic | ICD-10-CM

## 2018-08-08 DIAGNOSIS — R54 AGE-RELATED PHYSICAL DEBILITY: Chronic | ICD-10-CM

## 2018-08-08 PROCEDURE — 99309 SBSQ NF CARE MODERATE MDM 30: CPT | Performed by: FAMILY MEDICINE

## 2018-08-22 ENCOUNTER — NURSING HOME (OUTPATIENT)
Dept: FAMILY MEDICINE CLINIC | Facility: CLINIC | Age: 83
End: 2018-08-22

## 2018-08-22 DIAGNOSIS — I10 ESSENTIAL HYPERTENSION: Chronic | ICD-10-CM

## 2018-08-22 DIAGNOSIS — Z78.9 IMPAIRED MOBILITY AND ADLS: Chronic | ICD-10-CM

## 2018-08-22 DIAGNOSIS — I48.20 CHRONIC ATRIAL FIBRILLATION (HCC): Chronic | ICD-10-CM

## 2018-08-22 DIAGNOSIS — R54 AGE-RELATED PHYSICAL DEBILITY: Chronic | ICD-10-CM

## 2018-08-22 DIAGNOSIS — Z74.09 IMPAIRED MOBILITY AND ADLS: Chronic | ICD-10-CM

## 2018-08-22 DIAGNOSIS — J41.0 SIMPLE CHRONIC BRONCHITIS (HCC): Chronic | ICD-10-CM

## 2018-08-22 DIAGNOSIS — F01.518 VASCULAR DEMENTIA WITH BEHAVIOR DISTURBANCE (HCC): Primary | Chronic | ICD-10-CM

## 2018-08-22 PROCEDURE — 99308 SBSQ NF CARE LOW MDM 20: CPT | Performed by: FAMILY MEDICINE

## 2018-08-29 ENCOUNTER — NURSING HOME (OUTPATIENT)
Dept: FAMILY MEDICINE CLINIC | Facility: CLINIC | Age: 83
End: 2018-08-29

## 2018-08-29 DIAGNOSIS — J41.0 SIMPLE CHRONIC BRONCHITIS (HCC): Chronic | ICD-10-CM

## 2018-08-29 DIAGNOSIS — Z74.09 IMPAIRED MOBILITY AND ADLS: Chronic | ICD-10-CM

## 2018-08-29 DIAGNOSIS — R54 AGE-RELATED PHYSICAL DEBILITY: Chronic | ICD-10-CM

## 2018-08-29 DIAGNOSIS — I10 ESSENTIAL HYPERTENSION: Chronic | ICD-10-CM

## 2018-08-29 DIAGNOSIS — Z78.9 IMPAIRED MOBILITY AND ADLS: Chronic | ICD-10-CM

## 2018-08-29 DIAGNOSIS — I48.20 CHRONIC ATRIAL FIBRILLATION (HCC): Chronic | ICD-10-CM

## 2018-08-29 DIAGNOSIS — F01.518 VASCULAR DEMENTIA WITH BEHAVIOR DISTURBANCE (HCC): Primary | Chronic | ICD-10-CM

## 2018-08-29 PROCEDURE — 99309 SBSQ NF CARE MODERATE MDM 30: CPT | Performed by: FAMILY MEDICINE

## 2018-09-05 ENCOUNTER — NURSING HOME (OUTPATIENT)
Dept: FAMILY MEDICINE CLINIC | Facility: CLINIC | Age: 83
End: 2018-09-05

## 2018-09-05 DIAGNOSIS — R54 AGE-RELATED PHYSICAL DEBILITY: Chronic | ICD-10-CM

## 2018-09-05 DIAGNOSIS — F01.518 VASCULAR DEMENTIA WITH BEHAVIOR DISTURBANCE (HCC): Primary | Chronic | ICD-10-CM

## 2018-09-05 DIAGNOSIS — I48.20 CHRONIC ATRIAL FIBRILLATION (HCC): Chronic | ICD-10-CM

## 2018-09-05 DIAGNOSIS — Z74.09 IMPAIRED MOBILITY AND ADLS: Chronic | ICD-10-CM

## 2018-09-05 DIAGNOSIS — J41.8 MIXED SIMPLE AND MUCOPURULENT CHRONIC BRONCHITIS (HCC): Chronic | ICD-10-CM

## 2018-09-05 DIAGNOSIS — I10 ESSENTIAL HYPERTENSION: Chronic | ICD-10-CM

## 2018-09-05 DIAGNOSIS — Z78.9 IMPAIRED MOBILITY AND ADLS: Chronic | ICD-10-CM

## 2018-09-05 PROCEDURE — 99309 SBSQ NF CARE MODERATE MDM 30: CPT | Performed by: FAMILY MEDICINE

## 2018-09-12 ENCOUNTER — NURSING HOME (OUTPATIENT)
Dept: FAMILY MEDICINE CLINIC | Facility: CLINIC | Age: 83
End: 2018-09-12

## 2018-09-12 DIAGNOSIS — Z74.09 IMPAIRED MOBILITY AND ADLS: Chronic | ICD-10-CM

## 2018-09-12 DIAGNOSIS — R54 AGE-RELATED PHYSICAL DEBILITY: Chronic | ICD-10-CM

## 2018-09-12 DIAGNOSIS — F01.518 VASCULAR DEMENTIA WITH BEHAVIOR DISTURBANCE (HCC): Primary | Chronic | ICD-10-CM

## 2018-09-12 DIAGNOSIS — Z78.9 IMPAIRED MOBILITY AND ADLS: Chronic | ICD-10-CM

## 2018-09-12 DIAGNOSIS — I48.20 CHRONIC ATRIAL FIBRILLATION (HCC): Chronic | ICD-10-CM

## 2018-09-12 DIAGNOSIS — I10 ESSENTIAL HYPERTENSION: Chronic | ICD-10-CM

## 2018-09-12 DIAGNOSIS — J41.0 SIMPLE CHRONIC BRONCHITIS (HCC): Chronic | ICD-10-CM

## 2018-09-12 PROCEDURE — 99309 SBSQ NF CARE MODERATE MDM 30: CPT | Performed by: FAMILY MEDICINE

## 2018-09-19 ENCOUNTER — NURSING HOME (OUTPATIENT)
Dept: FAMILY MEDICINE CLINIC | Facility: CLINIC | Age: 83
End: 2018-09-19

## 2018-09-19 DIAGNOSIS — Z78.9 IMPAIRED MOBILITY AND ADLS: Primary | Chronic | ICD-10-CM

## 2018-09-19 DIAGNOSIS — F01.518 VASCULAR DEMENTIA WITH BEHAVIOR DISTURBANCE (HCC): Chronic | ICD-10-CM

## 2018-09-19 DIAGNOSIS — I48.20 CHRONIC ATRIAL FIBRILLATION (HCC): Chronic | ICD-10-CM

## 2018-09-19 DIAGNOSIS — Z74.09 IMPAIRED MOBILITY AND ADLS: Primary | Chronic | ICD-10-CM

## 2018-09-19 DIAGNOSIS — I10 ESSENTIAL HYPERTENSION: Chronic | ICD-10-CM

## 2018-09-19 DIAGNOSIS — R54 AGE-RELATED PHYSICAL DEBILITY: Chronic | ICD-10-CM

## 2018-09-19 PROCEDURE — 99309 SBSQ NF CARE MODERATE MDM 30: CPT | Performed by: FAMILY MEDICINE

## 2018-09-26 ENCOUNTER — NURSING HOME (OUTPATIENT)
Dept: FAMILY MEDICINE CLINIC | Facility: CLINIC | Age: 83
End: 2018-09-26

## 2018-09-26 DIAGNOSIS — J41.0 SIMPLE CHRONIC BRONCHITIS (HCC): Chronic | ICD-10-CM

## 2018-09-26 DIAGNOSIS — R54 AGE-RELATED PHYSICAL DEBILITY: Chronic | ICD-10-CM

## 2018-09-26 DIAGNOSIS — F01.518 VASCULAR DEMENTIA WITH BEHAVIOR DISTURBANCE (HCC): Chronic | ICD-10-CM

## 2018-09-26 DIAGNOSIS — I10 ESSENTIAL HYPERTENSION: Primary | Chronic | ICD-10-CM

## 2018-09-26 DIAGNOSIS — Z74.09 IMPAIRED MOBILITY AND ADLS: Chronic | ICD-10-CM

## 2018-09-26 DIAGNOSIS — Z78.9 IMPAIRED MOBILITY AND ADLS: Chronic | ICD-10-CM

## 2018-09-26 DIAGNOSIS — I48.20 CHRONIC ATRIAL FIBRILLATION (HCC): Chronic | ICD-10-CM

## 2018-09-26 PROCEDURE — 99308 SBSQ NF CARE LOW MDM 20: CPT | Performed by: FAMILY MEDICINE

## 2018-09-26 NOTE — PROGRESS NOTES
Nursing Home Progress Note      Patient Name: Jaleesa Marinelli   YOB: 1929    Date of Service: 8/8/2018      Facility: [place] []   [place] []   [facility] []   [facility] [x]   Other []       CHIEF COMPLAINT:  CMM/LTC     HISTORY OF PRESENT ILLNESS:  [x]  Follow Up visit for coordination of long term care issues and chronic medical management needs.    Vascular dementia with behavioral disturbance/impaired mobility and ADLs/age-related physical debility/chronic A. fib/COPD/hypertension    PAST MEDICAL & SURGICAL HISTORY:  Past Medical History:   Diagnosis Date   • Atrial fibrillation (CMS/HCC)    • COPD (chronic obstructive pulmonary disease) (CMS/HCC)    • Hypertension       Past Surgical History:   Procedure Laterality Date   • HYSTERECTOMY          MEDICATIONS:  Medication administration record for Jaleesa Marinelli reviewed and reconciled today    ALLERGIES:  Allergies   Allergen Reactions   • Macrobid [Nitrofurantoin Macrocrystal] Unknown (See Comments)     unknown        REVIEW OF SYSTEMS:  • Appetite: Fair [x]   Good []   Poor []   Weight Loss []  [x]  Weight Stable   Unavoidable Weight Loss []  Tolerating Tube Feeding []    Supplements Provided []   • Constitutional: Negative for fever, chills, diaphoresis or fatigue and weight change.   • HENT: No dysphagia; no changes to vision/hearing/smell/taste; no epistaxis  • Eyes: Negative for redness and visual disturbance.   • Respiratory: Negative for shortness of breath, chest pain, cough or chest tightness.   • Cardiovascular: Negative for chest pain and palpitations.   • Gastrointestinal: Negative for abdominal distention, abdominal pain and blood in stool.   • Endocrine: Negative for cold intolerance and heat intolerance.   • Genitourinary: Negative for difficulty urinating, dysuria and frequency.Negative for hematuria   • Musculoskeletal: Chronic myalgias and arthralgias  • Integumentary: No open wounds, rash or concerning skin lesions  • Neurological:  Negative for syncope, weakness and headaches.   • Hematological: Negative for adenopathy. Does not bruise/bleed easily.   • Immunological: Negative for reported allergies or immunological disorders  • Psychological: Occasional increased anxiety    PHYSICAL EXAMINATION:  VITAL SIGNS: /78, HR 72 bpm, RR 18, weight 126    General Appearance:  [x]  Alert   [x]  Oriented x person  [x]  No acute distress, chronically ill-appearing female    [x]  Confused  []  Disoriented   []  Comatose   Head:  Atraumatic and normocephalic, without obvious abnormality.   Eyes:          PERRLA, conjunctivae and sclerae normal, no Icterus. No pallor. Extra-occular movements are within normal limits.   Ears:  Ears appear intact with no abnormalities noted.   Throat: No oral lesions, no thrush, oral mucosa moist.   Neck: Supple, trachea midline, no thyromegaly, no carotid bruit.   Back:   No kyphoscoliosis. No tenderness to palpation.   Lungs:   Chest shape is normal. Breath sounds heard bilaterally equally.  No wheezing.  Bilateral basal crackles heard.    Heart:  Normal S1 and S2, no murmur, no gallop, no rub. No JVD.   Abdomen:   Normal bowel sounds, no masses, no organomegaly. Soft, non-tender, non-distended, no guarding    Extremities: Moves all extremities well, edema, cyanosis or clubbing.  Frail build.    Pulses: Pulses palpable and equal bilaterally.   Skin: No bleeding or rash.  Generalized dry skin noted.  Age-related atrophy of skin.   Neurologic: [x] Normal speech []  Normal mental status    [x] Cranial nerves II through XII intact   [x]  No anosmia [x]  DTR 2+ [x]  Proprioception intact  []  No focal motor/sensory deficits     Psych/Mood:        [x]  No acute changes []  Depressed  Urinary:        []  Continent  [x]  Incontinent  []  Retention  []  F/C     []  UTI w/treatment in progress  RECORD REVIEW:   • Consult notes []   • Admission and subsequent notes []   •  [x] I have personally reviewed and interpreted available  lab data, radiology studies and ECG obtained at time of visit.  [x] Medication Review: I have reviewed the patients active and prn medications at Skilled Nursing Facility     ASSESSMENT   Diagnoses and all orders for this visit:    Vascular dementia with behavior disturbance    Impaired mobility and ADLs    Age-related physical debility    Chronic bronchitis, unspecified chronic bronchitis type (CMS/HCC)    Chronic atrial fibrillation (CMS/HCC)    Essential hypertension        PLAN  Continue supportive care as needed for ADLs, as well as mobilization.  Patient's outburst of increased anxiety continues to have decreased in frequency and severity, however still problematic when her family is not at bedside.  Consider need for reduction in dosing to risperidone if patient becomes too sedated/somnolent.  Blood pressure is at goal, heart rate well controlled on review of vital signs, patient remains asymptomatic with respect to this.  Continue other medications at current dosing and frequency.  Surveillance labs to be continued.  I do suspect patient's cognitive reserve is depleted, contributing to her periodic behaviors.    [x]  Discussed Patient in detail with nursing/staff, addressed all needs today.     [x]  Plan of Care Reviewed   []   PT/OT Reviewed   []  Order Changes  []   Discharge Plans Reviewed         Total face to face time spent with patient 25 minutes, 15 min in counseling.    Tony Marc DO.  8/8/2018

## 2018-10-17 ENCOUNTER — NURSING HOME (OUTPATIENT)
Dept: FAMILY MEDICINE CLINIC | Facility: CLINIC | Age: 83
End: 2018-10-17

## 2018-10-17 DIAGNOSIS — Z74.09 IMPAIRED MOBILITY AND ADLS: Chronic | ICD-10-CM

## 2018-10-17 DIAGNOSIS — J41.0 SIMPLE CHRONIC BRONCHITIS (HCC): Chronic | ICD-10-CM

## 2018-10-17 DIAGNOSIS — F01.518 VASCULAR DEMENTIA WITH BEHAVIOR DISTURBANCE (HCC): Chronic | ICD-10-CM

## 2018-10-17 DIAGNOSIS — Z78.9 IMPAIRED MOBILITY AND ADLS: Chronic | ICD-10-CM

## 2018-10-17 DIAGNOSIS — I10 ESSENTIAL HYPERTENSION: Chronic | ICD-10-CM

## 2018-10-17 DIAGNOSIS — I48.20 CHRONIC ATRIAL FIBRILLATION (HCC): Chronic | ICD-10-CM

## 2018-10-17 DIAGNOSIS — R54 AGE-RELATED PHYSICAL DEBILITY: Primary | Chronic | ICD-10-CM

## 2018-10-17 PROCEDURE — 99308 SBSQ NF CARE LOW MDM 20: CPT | Performed by: FAMILY MEDICINE

## 2018-10-24 ENCOUNTER — NURSING HOME (OUTPATIENT)
Dept: FAMILY MEDICINE CLINIC | Facility: CLINIC | Age: 83
End: 2018-10-24

## 2018-10-24 DIAGNOSIS — R54 AGE-RELATED PHYSICAL DEBILITY: Chronic | ICD-10-CM

## 2018-10-24 DIAGNOSIS — F01.518 VASCULAR DEMENTIA WITH BEHAVIOR DISTURBANCE (HCC): Chronic | ICD-10-CM

## 2018-10-24 DIAGNOSIS — Z78.9 IMPAIRED MOBILITY AND ADLS: Primary | Chronic | ICD-10-CM

## 2018-10-24 DIAGNOSIS — J41.0 SIMPLE CHRONIC BRONCHITIS (HCC): Chronic | ICD-10-CM

## 2018-10-24 DIAGNOSIS — Z74.09 IMPAIRED MOBILITY AND ADLS: Primary | Chronic | ICD-10-CM

## 2018-10-24 DIAGNOSIS — I48.20 CHRONIC ATRIAL FIBRILLATION (HCC): Chronic | ICD-10-CM

## 2018-10-24 DIAGNOSIS — I10 ESSENTIAL HYPERTENSION: Chronic | ICD-10-CM

## 2018-10-24 PROCEDURE — 99308 SBSQ NF CARE LOW MDM 20: CPT | Performed by: FAMILY MEDICINE

## 2018-10-31 ENCOUNTER — NURSING HOME (OUTPATIENT)
Dept: FAMILY MEDICINE CLINIC | Facility: CLINIC | Age: 83
End: 2018-10-31

## 2018-10-31 DIAGNOSIS — R54 AGE-RELATED PHYSICAL DEBILITY: Chronic | ICD-10-CM

## 2018-10-31 DIAGNOSIS — Z74.09 IMPAIRED MOBILITY AND ADLS: Primary | Chronic | ICD-10-CM

## 2018-10-31 DIAGNOSIS — F01.518 VASCULAR DEMENTIA WITH BEHAVIOR DISTURBANCE (HCC): Chronic | ICD-10-CM

## 2018-10-31 DIAGNOSIS — I10 ESSENTIAL HYPERTENSION: Chronic | ICD-10-CM

## 2018-10-31 DIAGNOSIS — J44.9 CHRONIC OBSTRUCTIVE PULMONARY DISEASE, UNSPECIFIED COPD TYPE (HCC): Chronic | ICD-10-CM

## 2018-10-31 DIAGNOSIS — Z78.9 IMPAIRED MOBILITY AND ADLS: Primary | Chronic | ICD-10-CM

## 2018-10-31 DIAGNOSIS — I48.20 CHRONIC ATRIAL FIBRILLATION (HCC): Chronic | ICD-10-CM

## 2018-10-31 PROCEDURE — 99308 SBSQ NF CARE LOW MDM 20: CPT | Performed by: FAMILY MEDICINE

## 2018-10-31 NOTE — PROGRESS NOTES
Nursing Home Progress Note      Patient Name: Jaleesa Marinelli   YOB: 1929    Date of Service: 8/22/2018      Facility: [place] []   [place] []   [facility] []   [facility] [x]   Other []       CHIEF COMPLAINT:  CMM/LTC     HISTORY OF PRESENT ILLNESS:  [x]  Follow Up visit for coordination of long term care issues and chronic medical management needs.    Vascular dementia with behavioral disturbance/impaired mobility and ADLs/age-related physical debility/chronic A. fib/COPD/hypertension    PAST MEDICAL & SURGICAL HISTORY:  Past Medical History:   Diagnosis Date   • Atrial fibrillation (CMS/HCC)    • COPD (chronic obstructive pulmonary disease) (CMS/HCC)    • Hypertension       Past Surgical History:   Procedure Laterality Date   • HYSTERECTOMY          MEDICATIONS:  Medication administration record for Jaleesa Marinelli reviewed and reconciled today    ALLERGIES:  Allergies   Allergen Reactions   • Macrobid [Nitrofurantoin Macrocrystal] Unknown (See Comments)     unknown        REVIEW OF SYSTEMS:  • Appetite: Fair [x]   Good []   Poor []   Weight Loss []  [x]  Weight Stable   Unavoidable Weight Loss []  Tolerating Tube Feeding []    Supplements Provided []   • Constitutional: Negative for fever, chills, diaphoresis or fatigue and weight change.   • HENT: No dysphagia; no changes to vision/hearing/smell/taste; no epistaxis  • Eyes: Negative for redness and visual disturbance.   • Respiratory: Negative for shortness of breath, chest pain, cough or chest tightness.   • Cardiovascular: Negative for chest pain and palpitations.   • Gastrointestinal: Negative for abdominal distention, abdominal pain and blood in stool.   • Endocrine: Negative for cold intolerance and heat intolerance.   • Genitourinary: Negative for difficulty urinating, dysuria and frequency.Negative for hematuria   • Musculoskeletal: Chronic myalgias and arthralgias  • Integumentary: No open wounds, rash or concerning skin lesions  • Neurological:  Negative for syncope, weakness and headaches.   • Hematological: Negative for adenopathy. Does not bruise/bleed easily.   • Immunological: Negative for reported allergies or immunological disorders  • Psychological: Occasional increased anxiety    PHYSICAL EXAMINATION:  VITAL SIGNS: /68, HR 62 BPM, RR 16, weight 126    General Appearance:  [x]  Alert   [x]  Oriented x person  [x]  No acute distress, chronically ill-appearing female    [x]  Confused  []  Disoriented   []  Comatose   Head:  Atraumatic and normocephalic, without obvious abnormality.   Eyes:          PERRLA, conjunctivae and sclerae normal, no Icterus. No pallor. Extra-occular movements are within normal limits.   Ears:  Ears appear intact with no abnormalities noted.   Throat: No oral lesions, no thrush, oral mucosa moist.   Neck: Supple, trachea midline, no thyromegaly, no carotid bruit.   Back:   No kyphoscoliosis. No tenderness to palpation.   Lungs:   Chest shape is normal. Breath sounds heard bilaterally equally.  No wheezing.  Bilateral basal crackles heard.    Heart:  Normal S1 and S2, no murmur, no gallop, no rub. No JVD.   Abdomen:   Normal bowel sounds, no masses, no organomegaly. Soft, non-tender, non-distended, no guarding    Extremities: Moves all extremities well, edema, cyanosis or clubbing.  Frail build.    Pulses: Pulses palpable and equal bilaterally.   Skin: No bleeding or rash.  Generalized dry skin noted.  Age-related atrophy of skin.   Neurologic: [x] Normal speech []  Normal mental status    [x] Cranial nerves II through XII intact   [x]  No anosmia [x]  DTR 2+ [x]  Proprioception intact  []  No focal motor/sensory deficits     Psych/Mood:        [x]  No acute changes []  Depressed  Urinary:        []  Continent  [x]  Incontinent  []  Retention  []  F/C     []  UTI w/treatment in progress  RECORD REVIEW:   • Consult notes []   • Admission and subsequent notes []   •  [x] I have personally reviewed and interpreted available  lab data, radiology studies and ECG obtained at time of visit.  [x] Medication Review: I have reviewed the patients active and prn medications at [facility] Facility     ASSESSMENT   Diagnoses and all orders for this visit:    Vascular dementia with behavior disturbance    Simple chronic bronchitis (CMS/HCC)    Essential hypertension    Chronic atrial fibrillation (CMS/HCC)    Impaired mobility and ADLs    Age-related physical debility        PLAN  Continue supportive care as needed for ADLs, as well as mobilization.  Blood pressure is at goal, heart rate well controlled on review of vital signs, patient remains asymptomatic with respect to this.  Continue other medications at current dosing and frequency.  Surveillance labs to be continued.  I do suspect patient's cognitive reserve is nearly depleted, contributing to her periodic behaviors.    [x]  Discussed Patient in detail with nursing/staff, addressed all needs today.     [x]  Plan of Care Reviewed   []   PT/OT Reviewed   []  Order Changes  []   Discharge Plans Reviewed         Total face to face time spent with patient 25 minutes, 15 min in counseling.    Tony Marc DO.  8/22/2018

## 2018-10-31 NOTE — PROGRESS NOTES
Nursing Home Progress Note      Patient Name: Jaleesa Marinelli   YOB: 1929    Date of Service: 8/29/2018      Facility: [place] []   [place] []   [facility] []   [facility] [x]   Other []       CHIEF COMPLAINT:  CMM/LTC     HISTORY OF PRESENT ILLNESS:  [x]  Follow Up visit for coordination of long term care issues and chronic medical management needs.    Vascular dementia with behavioral disturbance/impaired mobility and ADLs/age-related physical debility/chronic A. fib/COPD/hypertension    PAST MEDICAL & SURGICAL HISTORY:  Past Medical History:   Diagnosis Date   • Atrial fibrillation (CMS/HCC)    • COPD (chronic obstructive pulmonary disease) (CMS/HCC)    • Hypertension       Past Surgical History:   Procedure Laterality Date   • HYSTERECTOMY          MEDICATIONS:  Medication administration record for Jaleesa Marinelli reviewed and reconciled today    ALLERGIES:  Allergies   Allergen Reactions   • Macrobid [Nitrofurantoin Macrocrystal] Unknown (See Comments)     unknown        REVIEW OF SYSTEMS:  • Appetite: Fair [x]   Good []   Poor []   Weight Loss []  [x]  Weight Stable   Unavoidable Weight Loss []  Tolerating Tube Feeding []    Supplements Provided []   • Constitutional: Negative for fever, chills, diaphoresis or fatigue and weight change.   • HENT: No dysphagia; no changes to vision/hearing/smell/taste; no epistaxis  • Eyes: Negative for redness and visual disturbance.   • Respiratory: Negative for shortness of breath, chest pain, cough or chest tightness.   • Cardiovascular: Negative for chest pain and palpitations.   • Gastrointestinal: Negative for abdominal distention, abdominal pain and blood in stool.   • Endocrine: Negative for cold intolerance and heat intolerance.   • Genitourinary: Negative for difficulty urinating, dysuria and frequency.Negative for hematuria   • Musculoskeletal: Chronic myalgias and arthralgias  • Integumentary: No open wounds, rash or concerning skin lesions  • Neurological:  Negative for syncope, weakness and headaches.   • Hematological: Negative for adenopathy. Does not bruise/bleed easily.   • Immunological: Negative for reported allergies or immunological disorders  • Psychological: Occasional increased anxiety    PHYSICAL EXAMINATION:  VITAL SIGNS: /54, HR 80 bpm, RR 18, weight 123    General Appearance:  [x]  Alert   [x]  Oriented x person  [x]  No acute distress, chronically ill-appearing female    [x]  Confused  []  Disoriented   []  Comatose   Head:  Atraumatic and normocephalic, without obvious abnormality.   Eyes:          PERRLA, conjunctivae and sclerae normal, no Icterus. No pallor. Extra-occular movements are within normal limits.   Ears:  Ears appear intact with no abnormalities noted.   Throat: No oral lesions, no thrush, oral mucosa moist.   Neck: Supple, trachea midline, no thyromegaly, no carotid bruit.   Back:   No kyphoscoliosis. No tenderness to palpation.   Lungs:   Chest shape is normal. Breath sounds heard bilaterally equally.  No wheezing.  Bilateral basal crackles heard.    Heart:  Normal S1 and S2, no murmur, no gallop, no rub. No JVD.   Abdomen:   Normal bowel sounds, no masses, no organomegaly. Soft, non-tender, non-distended, no guarding    Extremities: Moves all extremities well, edema, cyanosis or clubbing.  Frail build.    Pulses: Pulses palpable and equal bilaterally.   Skin: No bleeding or rash.  Generalized dry skin noted.  Age-related atrophy of skin.   Neurologic: [x] Normal speech []  Normal mental status    [x] Cranial nerves II through XII intact   [x]  No anosmia [x]  DTR 2+ [x]  Proprioception intact  []  No focal motor/sensory deficits     Psych/Mood:        [x]  No acute changes []  Depressed  Urinary:        []  Continent  [x]  Incontinent  []  Retention  []  F/C     []  UTI w/treatment in progress  RECORD REVIEW:   • Consult notes []   • Admission and subsequent notes []   •  [x] I have personally reviewed and interpreted available  lab data, radiology studies and ECG obtained at time of visit.  [x] Medication Review: I have reviewed the patients active and prn medications at [facility]     ASSESSMENT   Diagnoses and all orders for this visit:    Vascular dementia with behavior disturbance    Impaired mobility and ADLs    Age-related physical debility    Chronic atrial fibrillation (CMS/HCC)    Essential hypertension    Simple chronic bronchitis (CMS/HCC)        PLAN  Continue supportive care as needed for ADLs, as well as mobilization.  Patient's outburst of increased anxiety continues periodically, however continues to be problematic when her family is not at bedside.  Blood pressure is at goal, heart rate well controlled on review of vital signs, patient remains asymptomatic with respect to this.  Continue other medications at current dosing and frequency.  Surveillance labs to be continued.  Clinically, I suspect patient's cognitive reserve is depleted, contributing to her periodic behaviors.  Noted 3 pound weight loss.    [x]  Discussed Patient in detail with nursing/staff, addressed all needs today.     [x]  Plan of Care Reviewed   []   PT/OT Reviewed   []  Order Changes  []   Discharge Plans Reviewed         Total face to face time spent with patient 25 minutes, 15 min in counseling.    Tony Marc DO.  8/29/2018

## 2018-11-05 ENCOUNTER — NURSING HOME (OUTPATIENT)
Dept: FAMILY MEDICINE CLINIC | Facility: CLINIC | Age: 83
End: 2018-11-05

## 2018-11-05 DIAGNOSIS — F01.518 VASCULAR DEMENTIA WITH BEHAVIOR DISTURBANCE (HCC): Chronic | ICD-10-CM

## 2018-11-05 DIAGNOSIS — M25.511 ACUTE PAIN OF RIGHT SHOULDER: ICD-10-CM

## 2018-11-05 DIAGNOSIS — I48.20 CHRONIC ATRIAL FIBRILLATION (HCC): ICD-10-CM

## 2018-11-05 DIAGNOSIS — Z78.9 IMPAIRED MOBILITY AND ADLS: Chronic | ICD-10-CM

## 2018-11-05 DIAGNOSIS — Z74.09 IMPAIRED MOBILITY AND ADLS: Chronic | ICD-10-CM

## 2018-11-05 PROCEDURE — 99309 SBSQ NF CARE MODERATE MDM 30: CPT | Performed by: NURSE PRACTITIONER

## 2018-11-06 NOTE — PROGRESS NOTES
Nursing Home Follow Up Note      Tony Marc DO []   [facility] [x]  [address] [place] [address]  Phone: [phone]  Fax: [phone] Edvin Ugalde MD []    Tyrone Ratliff DO []   [address] [place] [address]  Phone: [phone]  Fax: [phone]     PATIENT NAME: Jaleesa Marinelli                                                                          YOB: 1929           DATE OF SERVICE: 11/05/2018  FACILITY:  [][facility]   [] [facility]   [] [facility]   [x] [facility]   [] Other ______________________________________________________________________      CHIEF COMPLAINT:  Right shoulder pain and constipation. CMM and LTC      HISTORY OF PRESENT ILLNESS:   Patient c/o of pain in her right shoulder, for the past 3 weeks. She did not injure it in anyway. She just woke up one morning, and her shoulder was hurting. It hurts when pressure is applied to it and it hurts with ROM.     PAST MEDICAL & SURGICAL HISTORY:   Past Medical History:   Diagnosis Date   • Atrial fibrillation (CMS/HCC)    • COPD (chronic obstructive pulmonary disease) (CMS/HCC)    • Hypertension       Past Surgical History:   Procedure Laterality Date   • HYSTERECTOMY           MEDICATIONS:  I have reviewed and reconciled the patients medication list in the patients chart at the skilled nursing facility today.      ALLERGIES:    Allergies   Allergen Reactions   • Macrobid [Nitrofurantoin Macrocrystal] Unknown (See Comments)     unknown         SOCIAL HISTORY:    Social History     Social History   • Marital status:      Spouse name: N/A   • Number of children: N/A   • Years of education: N/A     Occupational History   • Not on file.     Social History Main Topics   • Smoking status: Never Smoker   • Smokeless tobacco: Not on file   • Alcohol use No   • Drug use: No   • Sexual activity: Not on file     Other Topics Concern   • Not on file     Social History Narrative   • No narrative on file        FAMILY HISTORY:    No family history on file.    REVIEW OF SYSTEMS:    Review of Systems   Constitutional: Negative for activity change, appetite change, chills, diaphoresis, fatigue, fever, unexpected weight gain and unexpected weight loss.   HENT: Negative for congestion, ear pain, mouth sores, nosebleeds, postnasal drip, rhinorrhea, sinus pressure, sneezing, sore throat and trouble swallowing.    Eyes: Negative for blurred vision, pain, discharge, redness, itching and visual disturbance.   Respiratory: Negative for apnea, cough, choking, chest tightness, shortness of breath, wheezing and stridor.    Cardiovascular: Negative for chest pain, palpitations and leg swelling.   Gastrointestinal: Negative for abdominal distention, abdominal pain, blood in stool, constipation, diarrhea, nausea, vomiting, GERD and indigestion.   Endocrine: Negative for polydipsia and polyuria.   Genitourinary: Negative for urinary incontinence, decreased urine volume, difficulty urinating, dysuria, flank pain, frequency, hematuria and urgency.   Musculoskeletal: Positive for arthralgias. Negative for back pain, gait problem, joint swelling and myalgias.        Right shoulder pain   Skin: Negative for color change, dry skin, rash and skin lesions.   Allergic/Immunologic: Negative for environmental allergies.   Neurological: Positive for memory problem. Negative for dizziness, seizures, speech difficulty, weakness and confusion.   Psychiatric/Behavioral: Negative for behavioral problems, dysphoric mood, hallucinations, sleep disturbance and depressed mood. The patient is not nervous/anxious.          PHYSICAL EXAMINATION:   VITAL SIGNS: BP: 122/74,  HR: 70,   RR:18,    02: 97%,    T: 98.3,    Wt: 150    Physical Exam   Constitutional: She appears well-developed and well-nourished.   HENT:   Head: Normocephalic.   Right Ear: External ear normal.   Left Ear: External ear normal.   Nose: Nose normal.   Eyes: Conjunctivae are normal.    Neck: Normal range of motion.   Cardiovascular: Normal rate, regular rhythm, normal heart sounds and intact distal pulses.    Pulmonary/Chest: Effort normal and breath sounds normal. No respiratory distress. She has no wheezes. She has no rales.   Abdominal: Soft. Bowel sounds are normal. She exhibits no distension and no mass. There is no tenderness. No hernia.   Musculoskeletal: She exhibits tenderness. She exhibits no edema.        Right shoulder: She exhibits decreased range of motion, tenderness and pain.        Arms:  Neurological: She is alert.   Skin: Skin is warm and dry. No rash noted.   Psychiatric: She has a normal mood and affect. Her behavior is normal.   Nursing note and vitals reviewed.      RECORDS REVIEW:   I have reviewed and interpreted the following labs at today's visit: 10/16/18: GFR 47, Creat. 1.1    ASSESSMENT     Diagnoses and all orders for this visit:    Acute pain of right shoulder    Vascular dementia with behavior disturbance    Impaired mobility and ADLs    Chronic atrial fibrillation (CMS/HCC)        PLAN    Naproxen prescribed for treatment of her right shoulder pain. Steroid treatment discussed with patient and daughter. Daughter requests to see if Naproxen helps before steroid therapy initiated. Staff advised not to lay patient on right shoulder for the next week. Will order Xray if symptoms do not improve after NSAID and steroid treatment.     Dementia stable with no acute behavior changes. Appetite and weight stable.     Rate and rhythm controlled currently.     Patient, daughter and staff,  encouraged to keep me informed of any acute changes, lack of improvement, or any new concerning symptoms.       [x]  Discussed Patient in detail with nursing/staff, addressed all needs today.     [x]  Plan of Care Reviewed   []  PT/OT Reviewed   [x]  Order Changes  []  Discharge Plans Reviewed  [x]  Advance Directive on file with Nursing Home.   [x]  POA on file with Nursing Home.   [x]   Code Status listed: []  Full Code   [x]  DNR          Total face to face time spent with patient 20 minutes. Of which  15 minutes where in counseling the patient and family.     Dominique Oliveira, TESSA.

## 2018-11-07 ENCOUNTER — NURSING HOME (OUTPATIENT)
Dept: FAMILY MEDICINE CLINIC | Facility: CLINIC | Age: 83
End: 2018-11-07

## 2018-11-07 DIAGNOSIS — Z74.09 IMPAIRED MOBILITY AND ADLS: Primary | Chronic | ICD-10-CM

## 2018-11-07 DIAGNOSIS — R54 AGE-RELATED PHYSICAL DEBILITY: Chronic | ICD-10-CM

## 2018-11-07 DIAGNOSIS — J44.9 CHRONIC OBSTRUCTIVE BRONCHITIS (HCC): Chronic | ICD-10-CM

## 2018-11-07 DIAGNOSIS — Z78.9 IMPAIRED MOBILITY AND ADLS: Primary | Chronic | ICD-10-CM

## 2018-11-07 DIAGNOSIS — F01.518 VASCULAR DEMENTIA WITH BEHAVIOR DISTURBANCE (HCC): Chronic | ICD-10-CM

## 2018-11-07 DIAGNOSIS — I48.20 CHRONIC ATRIAL FIBRILLATION (HCC): Chronic | ICD-10-CM

## 2018-11-07 DIAGNOSIS — I10 ESSENTIAL HYPERTENSION: Chronic | ICD-10-CM

## 2018-11-07 PROCEDURE — 99309 SBSQ NF CARE MODERATE MDM 30: CPT | Performed by: FAMILY MEDICINE

## 2018-11-14 NOTE — PROGRESS NOTES
Nursing Home Progress Note      Patient Name: Jaleesa Marinelli   YOB: 1929    Date of Service: 9/5/2018      Facility: [place] []   [place] []   [facility] []   [facility] [x]   Other []       CHIEF COMPLAINT:  CMM/LTC     HISTORY OF PRESENT ILLNESS:  [x]  Follow Up visit for coordination of long term care issues and chronic medical management needs.    Vascular dementia with behavioral disturbance/impaired mobility and ADLs/age-related physical debility/chronic A. fib/COPD/hypertension    PAST MEDICAL & SURGICAL HISTORY:  Past Medical History:   Diagnosis Date   • Atrial fibrillation (CMS/HCC)    • COPD (chronic obstructive pulmonary disease) (CMS/HCC)    • Hypertension       Past Surgical History:   Procedure Laterality Date   • HYSTERECTOMY          MEDICATIONS:  Medication administration record for Jaleesa Marinelli reviewed and reconciled today    ALLERGIES:  Allergies   Allergen Reactions   • Macrobid [Nitrofurantoin Macrocrystal] Unknown (See Comments)     unknown        REVIEW OF SYSTEMS:  • Appetite: Fair [x]   Good []   Poor []   Weight Loss []  [x]  Weight Stable   Unavoidable Weight Loss []  Tolerating Tube Feeding []    Supplements Provided []   • Constitutional: Negative for fever, chills, diaphoresis or fatigue and weight change.   • HENT: No dysphagia; no changes to vision/hearing/smell/taste; no epistaxis  • Eyes: Negative for redness and visual disturbance.   • Respiratory: Negative for shortness of breath, chest pain, cough or chest tightness.   • Cardiovascular: Negative for chest pain and palpitations.   • Gastrointestinal: Negative for abdominal distention, abdominal pain and blood in stool.   • Endocrine: Negative for cold intolerance and heat intolerance.   • Genitourinary: Negative for difficulty urinating, dysuria and frequency.Negative for hematuria   • Musculoskeletal: Chronic myalgias and arthralgias  • Integumentary: No open wounds, rash or concerning skin lesions  • Neurological:  Negative for syncope, weakness and headaches.   • Hematological: Negative for adenopathy. Does not bruise/bleed easily.   • Immunological: Negative for reported allergies or immunological disorders  • Psychological: Occasional increased anxiety    PHYSICAL EXAMINATION:  VITAL SIGNS: /62, HR 62 BPM, RR 20, weight 123    General Appearance:  [x]  Alert   [x]  Oriented x person  [x]  No acute distress, chronically ill-appearing female    [x]  Confused  []  Disoriented   []  Comatose   Head:  Atraumatic and normocephalic, without obvious abnormality.   Eyes:          PERRLA, conjunctivae and sclerae normal, no Icterus. No pallor. Extra-occular movements are within normal limits.   Ears:  Ears appear intact with no abnormalities noted.   Throat: No oral lesions, no thrush, oral mucosa moist.   Neck: Supple, trachea midline, no thyromegaly, no carotid bruit.   Back:   No kyphoscoliosis. No tenderness to palpation.   Lungs:   Chest shape is normal. Breath sounds heard bilaterally equally.  No wheezing.  Bilateral basal crackles heard.    Heart:  Normal S1 and S2, no murmur, no gallop, no rub. No JVD.   Abdomen:   Normal bowel sounds, no masses, no organomegaly. Soft, non-tender, non-distended, no guarding    Extremities: Moves all extremities well, edema, cyanosis or clubbing.  Frail build.    Pulses: Pulses palpable and equal bilaterally.   Skin: No bleeding or rash.  Generalized dry skin noted.  Age-related atrophy of skin.   Neurologic: [x] Normal speech []  Normal mental status    [x] Cranial nerves II through XII intact   [x]  No anosmia [x]  DTR 2+ [x]  Proprioception intact  []  No focal motor/sensory deficits     Psych/Mood:        [x]  No acute changes []  Depressed  Urinary:        []  Continent  [x]  Incontinent  []  Retention  []  F/C     []  UTI w/treatment in progress  RECORD REVIEW:   • Consult notes []   • Admission and subsequent notes []   •  [x] I have personally reviewed and interpreted available  lab data, radiology studies and ECG obtained at time of visit.  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  Patient's outburst of increased anxiety continues periodically, however continues to be problematic when her family is not at bedside.  Blood pressure is at goal, heart rate well controlled on review of vital signs, patient remains asymptomatic with respect to this.  Continue other medications at current dosing and frequency.  Surveillance labs to be continued.  I continue to suspect patient's cognitive reserve is depleted, contributing to her periodic behaviors.  Noted weight stability.    [x]  Discussed Patient in detail with nursing/staff, addressed all needs today.     [x]  Plan of Care Reviewed   []   PT/OT Reviewed   []  Order Changes  []   Discharge Plans Reviewed         Total face to face time spent with patient 25 minutes, 15 min in counseling.    Tony Marc DO.  9/5/2018

## 2018-11-29 RX ORDER — HYDROCODONE BITARTRATE AND ACETAMINOPHEN 5; 325 MG/1; MG/1
1 TABLET ORAL 2 TIMES DAILY
Qty: 120 TABLET | Refills: 0 | Status: SHIPPED | OUTPATIENT
Start: 2018-11-29 | End: 2019-01-10 | Stop reason: SDUPTHER

## 2018-12-01 ENCOUNTER — LAB REQUISITION (OUTPATIENT)
Dept: LAB | Facility: HOSPITAL | Age: 83
End: 2018-12-01

## 2018-12-01 DIAGNOSIS — R82.998 OTHER ABNORMAL FINDINGS IN URINE: ICD-10-CM

## 2018-12-01 DIAGNOSIS — Z00.00 ROUTINE GENERAL MEDICAL EXAMINATION AT A HEALTH CARE FACILITY: ICD-10-CM

## 2018-12-01 LAB
BILIRUB UR QL STRIP: NEGATIVE
CLARITY UR: ABNORMAL
COLOR UR: YELLOW
GLUCOSE UR STRIP-MCNC: NEGATIVE MG/DL
HGB UR QL STRIP.AUTO: ABNORMAL
KETONES UR QL STRIP: NEGATIVE
LEUKOCYTE ESTERASE UR QL STRIP.AUTO: ABNORMAL
NITRITE UR QL STRIP: NEGATIVE
PH UR STRIP.AUTO: 5.5 [PH] (ref 5–8)
PROT UR QL STRIP: ABNORMAL
SP GR UR STRIP: >=1.03 (ref 1–1.03)
UROBILINOGEN UR QL STRIP: ABNORMAL

## 2018-12-01 PROCEDURE — 81003 URINALYSIS AUTO W/O SCOPE: CPT

## 2018-12-02 NOTE — PROGRESS NOTES
Nursing Home Progress Note      Patient Name: Jaleesa Marinelli   YOB: 1929    Date of Service: 9/12/2018      Facility: [place] []   [place] []   [facility] []   [facility] [x]   Other []       CHIEF COMPLAINT:  CMM/LTC     HISTORY OF PRESENT ILLNESS:  [x]  Follow Up visit for coordination of long term care issues and chronic medical management needs.    Vascular dementia with behavioral disturbance/impaired mobility and ADLs/age-related physical debility/chronic A. fib/COPD/hypertension    PAST MEDICAL & SURGICAL HISTORY:  Past Medical History:   Diagnosis Date   • Atrial fibrillation (CMS/HCC)    • COPD (chronic obstructive pulmonary disease) (CMS/HCC)    • Hypertension       Past Surgical History:   Procedure Laterality Date   • HYSTERECTOMY          MEDICATIONS:  Medication administration record for Jaleesa Marinelli reviewed and reconciled today    ALLERGIES:  Allergies   Allergen Reactions   • Macrobid [Nitrofurantoin Macrocrystal] Unknown (See Comments)     unknown        REVIEW OF SYSTEMS:  • Appetite: Fair [x]   Good []   Poor []   Weight Loss []  [x]  Weight Stable   Unavoidable Weight Loss []  Tolerating Tube Feeding []    Supplements Provided []   • Constitutional: Negative for fever, chills, diaphoresis or fatigue and weight change.   • HENT: No dysphagia; no changes to vision/hearing/smell/taste; no epistaxis  • Eyes: Negative for redness and visual disturbance.   • Respiratory: Negative for shortness of breath, chest pain, cough or chest tightness.   • Cardiovascular: Negative for chest pain and palpitations.   • Gastrointestinal: Negative for abdominal distention, abdominal pain and blood in stool.   • Endocrine: Negative for cold intolerance and heat intolerance.   • Genitourinary: Negative for difficulty urinating, dysuria and frequency.Negative for hematuria   • Musculoskeletal: Chronic myalgias and arthralgias  • Integumentary: No open wounds, rash or concerning skin lesions  • Neurological:  Negative for syncope, weakness and headaches.   • Hematological: Negative for adenopathy. Does not bruise/bleed easily.   • Immunological: Negative for reported allergies or immunological disorders  • Psychological: Occasional increased anxiety    PHYSICAL EXAMINATION:  VITAL SIGNS: /77, HR 80 BPM, RR 18, weight 122    General Appearance:  [x]  Alert   [x]  Oriented x person  [x]  No acute distress, chronically ill-appearing female    [x]  Confused  []  Disoriented   []  Comatose   Head:  Atraumatic and normocephalic, without obvious abnormality.   Eyes:          PERRLA, conjunctivae and sclerae normal, no Icterus. No pallor. Extra-occular movements are within normal limits.   Ears:  Ears appear intact with no abnormalities noted.   Throat: No oral lesions, no thrush, oral mucosa moist.   Neck: Supple, trachea midline, no thyromegaly, no carotid bruit.   Back:   No kyphoscoliosis. No tenderness to palpation.   Lungs:   Chest shape is normal. Breath sounds heard bilaterally equally.  No wheezing.  Bilateral basal crackles heard.    Heart:  Normal S1 and S2, no murmur, no gallop, no rub. No JVD.   Abdomen:   Normal bowel sounds, no masses, no organomegaly. Soft, non-tender, non-distended, no guarding    Extremities: Moves all extremities well, edema, cyanosis or clubbing.  Frail build.    Pulses: Pulses palpable and equal bilaterally.   Skin: No bleeding or rash.  Generalized dry skin noted.  Age-related atrophy of skin.   Neurologic: [x] Normal speech []  Normal mental status    [x] Cranial nerves II through XII intact   [x]  No anosmia [x]  DTR 2+ [x]  Proprioception intact  []  No focal motor/sensory deficits     Psych/Mood:        [x]  No acute changes []  Depressed  Urinary:        []  Continent  [x]  Incontinent  []  Retention  []  F/C     []  UTI w/treatment in progress  RECORD REVIEW:   • Consult notes []   • Admission and subsequent notes []   •  [x] I have personally reviewed and interpreted available  lab data, radiology studies and ECG obtained at time of visit.  [x] Medication Review: I have reviewed the patients active and prn medications at Skilled Nursing Facility     ASSESSMENT   Diagnoses and all orders for this visit:    Vascular dementia with behavior disturbance    Chronic atrial fibrillation (CMS/[facility])    Essential hypertension    Impaired mobility and ADLs    Simple chronic bronchitis (CMS/[facility])    Age-related physical debility        PLAN  Continue supportive care as needed for ADLs, as well as mobilization.  Patient's outburst of increased anxiety continues periodically.  Blood pressure is at goal, heart rate well controlled on review of vital signs, patient remains asymptomatic with respect to this.  Continue other medications at current dosing and frequency.  Surveillance labs to be continued.  I continue to suspect patient's cognitive reserve is depleted, contributing to her periodic behaviors.  Noted weight stability.    [x]  Discussed Patient in detail with nursing/staff, addressed all needs today.     [x]  Plan of Care Reviewed   []   PT/OT Reviewed   []  Order Changes  []   Discharge Plans Reviewed         Total face to face time spent with patient 25 minutes, 15 min in counseling.    Tony Marc DO.  9/12/2018

## 2018-12-03 ENCOUNTER — NURSING HOME (OUTPATIENT)
Dept: FAMILY MEDICINE CLINIC | Facility: CLINIC | Age: 83
End: 2018-12-03

## 2018-12-03 DIAGNOSIS — M25.511 ACUTE PAIN OF RIGHT SHOULDER: ICD-10-CM

## 2018-12-03 DIAGNOSIS — Z74.09 IMPAIRED MOBILITY AND ADLS: Chronic | ICD-10-CM

## 2018-12-03 DIAGNOSIS — F01.518 VASCULAR DEMENTIA WITH BEHAVIOR DISTURBANCE (HCC): Chronic | ICD-10-CM

## 2018-12-03 DIAGNOSIS — Z78.9 IMPAIRED MOBILITY AND ADLS: Chronic | ICD-10-CM

## 2018-12-03 DIAGNOSIS — N30.00 ACUTE CYSTITIS WITHOUT HEMATURIA: ICD-10-CM

## 2018-12-03 PROCEDURE — 99309 SBSQ NF CARE MODERATE MDM 30: CPT | Performed by: NURSE PRACTITIONER

## 2018-12-04 NOTE — PROGRESS NOTES
Nursing Home Follow Up Note      Tony Marc DO []   [facility] [x]  [address] [place] [address]  Phone: [phone]  Fax: [phone] Edvin Ugalde MD []    Tyrone Ratliff DO []   [address] [place] [address]  Phone: [phone]  Fax: [phone]     PATIENT NAME: Jaleesa Marinelli                                                                          YOB: 1929           DATE OF SERVICE: 12/3/2018  FACILITY:  [][place]   [] [place]   [] [facility]   [x] [facility]   [] Other ______________________________________________________________________      CHIEF COMPLAINT:  F/u UTI; F/u right shoulder pain    HISTORY OF PRESENT ILLNESS:   Patient's UA positive for UTI, culture reported sensitivity to Rocephin, so patient started on Rocephin 1 gm IM x 3 days, on 12/1. Patient reports resolution of dysuria. Patient also has c/o of pain in her right shoulder, for the past 5-6 weeks. She did not injure it in anyway. She just woke up one morning, and her shoulder was hurting. It hurts when pressure is applied to it and it hurts with ROM. It is now hurting down into the arm. An Xray was negative 3 weeks ago. Patient took Naproxen for 14 days, with no improvement.     PAST MEDICAL & SURGICAL HISTORY:   Past Medical History:   Diagnosis Date   • Atrial fibrillation (CMS/HCC)    • COPD (chronic obstructive pulmonary disease) (CMS/HCC)    • Hypertension       Past Surgical History:   Procedure Laterality Date   • HYSTERECTOMY           MEDICATIONS:  I have reviewed and reconciled the patients medication list in the patients chart at the skilled nursing facility today.      ALLERGIES:    Allergies   Allergen Reactions   • Macrobid [Nitrofurantoin Macrocrystal] Unknown (See Comments)     unknown         SOCIAL HISTORY:    Social History     Socioeconomic History   • Marital status:      Spouse name: Not on file   • Number of children: Not on file   • Years of education:  Not on file   • Highest education level: Not on file   Social Needs   • Financial resource strain: Not on file   • Food insecurity - worry: Not on file   • Food insecurity - inability: Not on file   • Transportation needs - medical: Not on file   • Transportation needs - non-medical: Not on file   Occupational History   • Not on file   Tobacco Use   • Smoking status: Never Smoker   Substance and Sexual Activity   • Alcohol use: No   • Drug use: No   • Sexual activity: Not on file   Other Topics Concern   • Not on file   Social History Narrative   • Not on file       FAMILY HISTORY:    No family history on file.    REVIEW OF SYSTEMS:    Review of Systems   Constitutional: Negative for activity change, appetite change, chills, diaphoresis, fatigue, fever, unexpected weight gain and unexpected weight loss.   HENT: Negative for congestion, ear pain, mouth sores, nosebleeds, postnasal drip, rhinorrhea, sinus pressure, sneezing, sore throat and trouble swallowing.    Eyes: Negative for blurred vision, pain, discharge, redness, itching and visual disturbance.   Respiratory: Negative for apnea, cough, choking, chest tightness, shortness of breath, wheezing and stridor.    Cardiovascular: Negative for chest pain, palpitations and leg swelling.   Gastrointestinal: Negative for abdominal distention, abdominal pain, blood in stool, constipation, diarrhea, nausea, vomiting, GERD and indigestion.   Endocrine: Negative for polydipsia and polyuria.   Genitourinary: Negative for urinary incontinence, decreased urine volume, difficulty urinating, dysuria, flank pain, frequency, hematuria and urgency.   Musculoskeletal: Positive for arthralgias. Negative for back pain, gait problem, joint swelling and myalgias.        Right shoulder pain   Skin: Negative for color change, dry skin, rash and skin lesions.   Allergic/Immunologic: Negative for environmental allergies.   Neurological: Positive for memory problem. Negative for dizziness,  seizures, speech difficulty, weakness and confusion.   Psychiatric/Behavioral: Negative for behavioral problems, dysphoric mood, hallucinations, sleep disturbance and depressed mood. The patient is not nervous/anxious.          PHYSICAL EXAMINATION:   VITAL SIGNS: BP: 120/70,  HR: 70,   RR:18,    02: 97%,    T: 97.5,    Wt: 126    Physical Exam   Constitutional: She appears well-developed and well-nourished.   HENT:   Head: Normocephalic.   Right Ear: External ear normal.   Left Ear: External ear normal.   Nose: Nose normal.   Eyes: Conjunctivae are normal.   Neck: Normal range of motion.   Cardiovascular: Normal rate, regular rhythm, normal heart sounds and intact distal pulses.   Pulmonary/Chest: Effort normal and breath sounds normal. No respiratory distress. She has no wheezes. She has no rales.   Abdominal: Soft. Bowel sounds are normal. She exhibits no distension and no mass. There is no tenderness. No hernia.   Musculoskeletal: She exhibits tenderness. She exhibits no edema.        Right shoulder: She exhibits decreased range of motion, tenderness and pain.        Arms:  Neurological: She is alert.   Skin: Skin is warm and dry. No rash noted.   Psychiatric: She has a normal mood and affect. Her behavior is normal.   Nursing note and vitals reviewed.      RECORDS REVIEW:   I have reviewed and interpreted the following labs at today's visit: 10/16/18: GFR 47, Creat. 1.1    ASSESSMENT     Diagnoses and all orders for this visit:    Acute cystitis without hematuria    Acute pain of right shoulder    Vascular dementia with behavior disturbance    Impaired mobility and ADLs        PLAN  UTI symptoms improving with treatment of Rocephin.    Xray of shoulder negative, NSAID treatment ineffective.  Steroid treatment discussed with patient and daughter. They agreed to having Depo Medrol 80 mg. Will monitor effectiveness and proceed with further treatment or imaging, if needed.     Dementia stable with no acute behavior  changes. Appetite and weight stable.     Staff to continue supportive care for all ADLs.     Patient, daughter and staff,  encouraged to keep me informed of any acute changes, lack of improvement, or any new concerning symptoms.       [x]  Discussed Patient in detail with nursing/staff, addressed all needs today.     [x]  Plan of Care Reviewed   []  PT/OT Reviewed   [x]  Order Changes  []  Discharge Plans Reviewed  [x]  Advance Directive on file with Nursing Home.   [x]  POA on file with Nursing Home.   [x]  Code Status listed: []  Full Code   [x]  DNR          Total face to face time spent with patient 15 minutes. Of which  10  where in counseling the patient and family.     Dominique Oliveira, APRN.

## 2018-12-05 PROBLEM — F01.518 VASCULAR DEMENTIA WITH BEHAVIOR DISTURBANCE (HCC): Chronic | Status: ACTIVE | Noted: 2018-07-09

## 2018-12-05 NOTE — PROGRESS NOTES
Nursing Home Progress Note      Patient Name: Jaleesa Marinelli   YOB: 1929    Date of Service: 9/19/2018      Facility: [facility] []   [facility] []   [facility] []   [facility] [x]   Other []       CHIEF COMPLAINT:  CMM/LTC     HISTORY OF PRESENT ILLNESS:  [x]  Follow Up visit for coordination of long term care issues and chronic medical management needs.    Vascular dementia with behavioral disturbance/impaired mobility and ADLs/age-related physical debility/chronic A. fib/COPD/hypertension    PAST MEDICAL & SURGICAL HISTORY:  Past Medical History:   Diagnosis Date   • Atrial fibrillation (CMS/HCC)    • COPD (chronic obstructive pulmonary disease) (CMS/HCC)    • Hypertension       Past Surgical History:   Procedure Laterality Date   • HYSTERECTOMY          MEDICATIONS:  Medication administration record for Jaleesa Marinelli reviewed and reconciled today    ALLERGIES:  Allergies   Allergen Reactions   • Macrobid [Nitrofurantoin Macrocrystal] Unknown (See Comments)     unknown        REVIEW OF SYSTEMS:  • Appetite: Fair [x]   Good []   Poor []   Weight Loss []  [x]  Weight Stable   Unavoidable Weight Loss []  Tolerating Tube Feeding []    Supplements Provided []   • Constitutional: Negative for fever, chills, diaphoresis or fatigue and weight change.   • HENT: No dysphagia; no changes to vision/hearing/smell/taste; no epistaxis  • Eyes: Negative for redness and visual disturbance.   • Respiratory: Negative for shortness of breath, chest pain, cough or chest tightness.   • Cardiovascular: Negative for chest pain and palpitations.   • Gastrointestinal: Negative for abdominal distention, abdominal pain and blood in stool.   • Endocrine: Negative for cold intolerance and heat intolerance.   • Genitourinary: Negative for difficulty urinating, dysuria and frequency.Negative for hematuria   • Musculoskeletal: Chronic myalgias and arthralgias  • Integumentary: No open wounds, rash or concerning skin lesions  • Neurological:  Negative for syncope, weakness and headaches.   • Hematological: Negative for adenopathy. Does not bruise/bleed easily.   • Immunological: Negative for reported allergies or immunological disorders  • Psychological: Occasional increased anxiety    PHYSICAL EXAMINATION:  VITAL SIGNS: /78, HR 74 BPM, RR 20, weight 124    General Appearance:  [x]  Alert   [x]  Oriented x person  [x]  No acute distress, chronically ill-appearing female    [x]  Confused  []  Disoriented   []  Comatose   Head:  Atraumatic and normocephalic, without obvious abnormality.   Eyes:          PERRLA, conjunctivae and sclerae normal, no Icterus. No pallor. Extra-occular movements are within normal limits.   Ears:  Ears appear intact with no abnormalities noted.   Throat: No oral lesions, no thrush, oral mucosa moist.   Neck: Supple, trachea midline, no thyromegaly, no carotid bruit.   Back:   No kyphoscoliosis. No tenderness to palpation.   Lungs:   Chest shape is normal. Breath sounds heard bilaterally equally.  No wheezing.  Bilateral basal crackles heard.    Heart:  Normal S1 and S2, no murmur, no gallop, no rub. No JVD.   Abdomen:   Normal bowel sounds, no masses, no organomegaly. Soft, non-tender, non-distended, no guarding    Extremities: Moves all extremities well, edema, cyanosis or clubbing.  Frail build.    Pulses: Pulses palpable and equal bilaterally.   Skin: No bleeding or rash.  Generalized dry skin noted.  Age-related atrophy of skin.   Neurologic: [x] Normal speech []  Normal mental status    [x] Cranial nerves II through XII intact   [x]  No anosmia [x]  DTR 2+ [x]  Proprioception intact  []  No focal motor/sensory deficits     Psych/Mood:        [x]  No acute changes []  Depressed  Urinary:        []  Continent  [x]  Incontinent  []  Retention  []  F/C     []  UTI w/treatment in progress  RECORD REVIEW:   • Consult notes []   • Admission and subsequent notes []   •  [x] I have personally reviewed and interpreted available  lab data, radiology studies and ECG obtained at time of visit.  [x] Medication Review: I have reviewed the patients active and prn medications at [facility] Facility     ASSESSMENT   Diagnoses and all orders for this visit:    Impaired mobility and ADLs    Age-related physical debility    Essential hypertension    Chronic atrial fibrillation (CMS/[facility])    Vascular dementia with behavior disturbance        PLAN  Continue supportive care as needed for ADLs, as well as mobilization.  Patient's outburst of increased anxiety continues periodically.  Blood pressure is at goal, heart rate well controlled, patient remains asymptomatic with respect to this.  Continue other medications at current dosing and frequency.  Surveillance labs to be continued.  I continue to suspect patient's cognitive reserve is depleted, contributing to her periodic behaviors.  Noted weight stability, approximate 2 pound gain.    [x]  Discussed Patient in detail with nursing/staff, addressed all needs today.     [x]  Plan of Care Reviewed   []   PT/OT Reviewed   []  Order Changes  []   Discharge Plans Reviewed         Total face to face time spent with patient 25 minutes, 15 min in counseling.    Tony Marc DO.  9/19/2018

## 2018-12-10 ENCOUNTER — NURSING HOME (OUTPATIENT)
Dept: FAMILY MEDICINE CLINIC | Facility: CLINIC | Age: 83
End: 2018-12-10

## 2018-12-10 DIAGNOSIS — Z74.09 IMPAIRED MOBILITY AND ADLS: Chronic | ICD-10-CM

## 2018-12-10 DIAGNOSIS — L98.9 SKIN LESION: ICD-10-CM

## 2018-12-10 DIAGNOSIS — F01.518 VASCULAR DEMENTIA WITH BEHAVIOR DISTURBANCE (HCC): Chronic | ICD-10-CM

## 2018-12-10 DIAGNOSIS — Z78.9 IMPAIRED MOBILITY AND ADLS: Chronic | ICD-10-CM

## 2018-12-10 DIAGNOSIS — I10 ESSENTIAL HYPERTENSION: Chronic | ICD-10-CM

## 2018-12-10 PROCEDURE — 99309 SBSQ NF CARE MODERATE MDM 30: CPT | Performed by: NURSE PRACTITIONER

## 2018-12-11 NOTE — PROGRESS NOTES
Nursing Home Follow Up Note      Tony Marc DO []   [facility] [x]  [address], [place] [address]  Phone: [phone]  Fax: [phone] Edvin Ugalde MD []    Tyrone Ratliff DO []   [address] [place] [address]  Phone: [phone]  Fax: [phone]     PATIENT NAME: Jaleesa Marinelli                                                                          YOB: 1929           DATE OF SERVICE: 12/10/2018  FACILITY:  [][facility]   [] [place]   [] [facility]   [x] [facility]   [] Other ______________________________________________________________________      CHIEF COMPLAINT:  Lesion on buttock    HISTORY OF PRESENT ILLNESS:   Vesicular lesion on right buttock since yesterday. Has not changed any since yesterday.    Coordination of LTC needs and chronic conditions.     PAST MEDICAL & SURGICAL HISTORY:   Past Medical History:   Diagnosis Date   • Atrial fibrillation (CMS/HCC)    • COPD (chronic obstructive pulmonary disease) (CMS/HCC)    • Hypertension       Past Surgical History:   Procedure Laterality Date   • HYSTERECTOMY           MEDICATIONS:  I have reviewed and reconciled the patients medication list in the patients chart at the skilled nursing facility today.      ALLERGIES:    Allergies   Allergen Reactions   • Macrobid [Nitrofurantoin Macrocrystal] Unknown (See Comments)     unknown         SOCIAL HISTORY:    Social History     Socioeconomic History   • Marital status:      Spouse name: Not on file   • Number of children: Not on file   • Years of education: Not on file   • Highest education level: Not on file   Social Needs   • Financial resource strain: Not on file   • Food insecurity - worry: Not on file   • Food insecurity - inability: Not on file   • Transportation needs - medical: Not on file   • Transportation needs - non-medical: Not on file   Occupational History   • Not on file   Tobacco Use   • Smoking status: Never Smoker   Substance and  Sexual Activity   • Alcohol use: No   • Drug use: No   • Sexual activity: Not on file   Other Topics Concern   • Not on file   Social History Narrative   • Not on file       FAMILY HISTORY:    No family history on file.    REVIEW OF SYSTEMS:    Review of Systems   Constitutional: Negative for activity change, appetite change, chills, diaphoresis, fatigue, fever, unexpected weight gain and unexpected weight loss.   HENT: Negative for congestion, ear pain, mouth sores, nosebleeds, postnasal drip, rhinorrhea, sinus pressure, sneezing, sore throat and trouble swallowing.    Eyes: Negative for blurred vision, pain, discharge, redness, itching and visual disturbance.   Respiratory: Negative for apnea, cough, choking, chest tightness, shortness of breath, wheezing and stridor.    Cardiovascular: Negative for chest pain, palpitations and leg swelling.   Gastrointestinal: Negative for abdominal distention, abdominal pain, blood in stool, constipation, diarrhea, nausea, vomiting, GERD and indigestion.   Endocrine: Negative for polydipsia and polyuria.   Genitourinary: Negative for urinary incontinence, decreased urine volume, difficulty urinating, dysuria, flank pain, frequency, hematuria and urgency.   Musculoskeletal: Positive for arthralgias. Negative for back pain, gait problem, joint swelling and myalgias.        Right shoulder pain   Skin: Positive for rash (right buttock). Negative for color change, dry skin and skin lesions.   Allergic/Immunologic: Negative for environmental allergies.   Neurological: Positive for memory problem and confusion. Negative for dizziness, seizures, speech difficulty and weakness.   Psychiatric/Behavioral: Negative for behavioral problems, dysphoric mood, hallucinations, sleep disturbance and depressed mood. The patient is not nervous/anxious.          PHYSICAL EXAMINATION:   VITAL SIGNS: BP: 118/64,  HR: 78,    RR:18,    02: 97%,    T: 98.1,    Wt: 125    Physical Exam   Constitutional:  She appears well-developed and well-nourished.   HENT:   Head: Normocephalic.   Right Ear: External ear normal.   Left Ear: External ear normal.   Nose: Nose normal.   Eyes: Conjunctivae are normal.   Neck: Normal range of motion.   Cardiovascular: Normal rate, regular rhythm and intact distal pulses.   Pulmonary/Chest: Effort normal and breath sounds normal. No respiratory distress. She has no wheezes. She has no rales.   Abdominal: Soft. Bowel sounds are normal. She exhibits no distension and no mass. There is no tenderness. No hernia.   Musculoskeletal: She exhibits tenderness. She exhibits no edema.        Right shoulder: She exhibits decreased range of motion, tenderness and pain.        Arms:  Neurological: She is alert.   Skin: Skin is warm and dry. No rash noted.        Psychiatric: She has a normal mood and affect. Her behavior is normal.   Nursing note and vitals reviewed.      RECORDS REVIEW:   I have reviewed and interpreted the following labs at today's visit: 10/16/18: GFR 47, Creat. 1.1    ASSESSMENT     Diagnoses and all orders for this visit:    Skin lesion    Essential hypertension    Vascular dementia with behavior disturbance    Impaired mobility and ADLs        PLAN  Skin lesion has appearance of reaction to adhesive dressing being removed from skin. Staff advised to watch closely and monitor for other lesions. If other vesicles form, start Acyclovir.     Dementia stable with no acute behavior changes. Appetite and weight stable.     Staff to continue supportive care for all ADLs.     Patient, daughter and staff,  encouraged to keep me informed of any acute changes, lack of improvement, or any new concerning symptoms.       [x]  Discussed Patient in detail with nursing/staff, addressed all needs today.     [x]  Plan of Care Reviewed   []  PT/OT Reviewed   [x]  Order Changes  []  Discharge Plans Reviewed  [x]  Advance Directive on file with Nursing Home.   [x]  POA on file with Nursing Home.    [x]  Code Status listed: []  Full Code   [x]  DNR          Total face to face time spent with patient 10 minutes.  Of which  7 where in counseling the patient and family.     Dominique Oliveira, TESSA.

## 2018-12-19 NOTE — PROGRESS NOTES
Nursing Home Progress Note      Patient Name: Jaleesa Marinelli   YOB: 1929    Date of Service: 09/26/2018    Facility: [facility] []   [facility] []   [facility] []   [facility] [x]   Other []       CHIEF COMPLAINT:  CMM/LTC     HISTORY OF PRESENT ILLNESS:  [x]  Follow Up visit for coordination of long term care issues and chronic medical management needs.    Vascular dementia with behavioral disturbance/impaired mobility and ADLs/age-related physical debility/chronic A. fib/COPD/hypertension    PAST MEDICAL & SURGICAL HISTORY:  Past Medical History:   Diagnosis Date   • Atrial fibrillation (CMS/HCC)    • COPD (chronic obstructive pulmonary disease) (CMS/HCC)    • Hypertension       Past Surgical History:   Procedure Laterality Date   • HYSTERECTOMY          MEDICATIONS:  Medication administration record for Jaleesa Marinelli reviewed and reconciled today    ALLERGIES:  Allergies   Allergen Reactions   • Macrobid [Nitrofurantoin Macrocrystal] Unknown (See Comments)     unknown        REVIEW OF SYSTEMS:  • Appetite: Fair [x]   Good []   Poor []   Weight Loss []  [x]  Weight Stable   Unavoidable Weight Loss []  Tolerating Tube Feeding []    Supplements Provided []   • Constitutional: Negative for fever, chills, diaphoresis or fatigue and weight change.   • HENT: No dysphagia; no changes to vision/hearing/smell/taste; no epistaxis  • Eyes: Negative for redness and visual disturbance.   • Respiratory: Negative for shortness of breath, chest pain, cough or chest tightness.   • Cardiovascular: Negative for chest pain and palpitations.   • Gastrointestinal: Negative for abdominal distention, abdominal pain and blood in stool.   • Endocrine: Negative for cold intolerance and heat intolerance.   • Genitourinary: Negative for difficulty urinating, dysuria and frequency.Negative for hematuria   • Musculoskeletal: Chronic myalgias and arthralgias  • Integumentary: No open wounds, rash or concerning skin lesions  • Neurological:  Negative for syncope, weakness and headaches.   • Hematological: Negative for adenopathy. Does not bruise/bleed easily.   • Immunological: Negative for reported allergies or immunological disorders  • Psychological: Occasional increased anxiety    PHYSICAL EXAMINATION:  VITAL SIGNS: /64, HR 64 BPM, RR 18, weight 125    General Appearance:  [x]  Alert   [x]  Oriented x person  [x]  No acute distress, chronically ill-appearing female    [x]  Confused  []  Disoriented   []  Comatose   Head:  Atraumatic and normocephalic, without obvious abnormality.   Eyes:          PERRLA, conjunctivae and sclerae normal, no Icterus. No pallor. Extra-occular movements are within normal limits.   Ears:  Ears appear intact with no abnormalities noted.   Throat: No oral lesions, no thrush, oral mucosa moist.   Neck: Supple, trachea midline, no thyromegaly, no carotid bruit.   Back:   No kyphoscoliosis. No tenderness to palpation.   Lungs:   Chest shape is normal. Breath sounds heard bilaterally equally.  No wheezing.  Bilateral basal crackles heard.    Heart:  Normal S1 and S2, no murmur, no gallop, no rub. No JVD.   Abdomen:   Normal bowel sounds, no masses, no organomegaly. Soft, non-tender, non-distended, no guarding    Extremities: Moves all extremities well, edema, cyanosis or clubbing.  Frail build.    Pulses: Pulses palpable and equal bilaterally.   Skin: No bleeding or rash.  Generalized dry skin noted.  Age-related atrophy of skin.   Neurologic: [x] Normal speech []  Normal mental status    [x] Cranial nerves II through XII intact   [x]  No anosmia [x]  DTR 2+ [x]  Proprioception intact  []  No focal motor/sensory deficits     Psych/Mood:        [x]  No acute changes []  Depressed  Urinary:        []  Continent  [x]  Incontinent  []  Retention  []  F/C     []  UTI w/treatment in progress  RECORD REVIEW:   • Consult notes []   • Admission and subsequent notes []   •  [x] I have personally reviewed and interpreted available  lab data, radiology studies and ECG obtained at time of visit.  [x] Medication Review: I have reviewed the patients active and prn medications at Skilled Nursing Facility     ASSESSMENT   Diagnoses and all orders for this visit:    Essential hypertension    Vascular dementia with behavior disturbance    Impaired mobility and ADLs    Simple chronic bronchitis (CMS/HCC)    Chronic atrial fibrillation (CMS/HCC)    Age-related physical debility        PLAN  Continue supportive care as needed for ADLs, as well as mobilization.  Patient's outburst of increased anxiety continues periodically.  Blood pressure is at goal, heart rate well controlled, patient remains asymptomatic with respect to this.  Continue other medications at current dosing and frequency.  Surveillance labs and needed.  I suspect patient's cognitive reserve is depleted, contributing to her periodic behaviors.  Noted weight stability, approximate 1 pound gain in's previous visit.    [x]  Discussed Patient in detail with nursing/staff, addressed all needs today.     [x]  Plan of Care Reviewed   []   PT/OT Reviewed   []  Order Changes  []   Discharge Plans Reviewed         Total face to face time spent with patient 25 minutes, 15 min in counseling.      09/26/2018

## 2019-01-01 ENCOUNTER — NURSING HOME (OUTPATIENT)
Dept: FAMILY MEDICINE CLINIC | Facility: CLINIC | Age: 84
End: 2019-01-01

## 2019-01-01 ENCOUNTER — DOCUMENTATION (OUTPATIENT)
Dept: FAMILY MEDICINE CLINIC | Facility: CLINIC | Age: 84
End: 2019-01-01
Payer: MEDICARE

## 2019-01-01 VITALS
SYSTOLIC BLOOD PRESSURE: 144 MMHG | HEART RATE: 82 BPM | BODY MASS INDEX: 22.11 KG/M2 | DIASTOLIC BLOOD PRESSURE: 80 MMHG | WEIGHT: 117 LBS | RESPIRATION RATE: 18 BRPM | TEMPERATURE: 97 F | OXYGEN SATURATION: 94 %

## 2019-01-01 VITALS
DIASTOLIC BLOOD PRESSURE: 80 MMHG | RESPIRATION RATE: 18 BRPM | TEMPERATURE: 97.9 F | HEART RATE: 80 BPM | WEIGHT: 117 LBS | BODY MASS INDEX: 22.11 KG/M2 | SYSTOLIC BLOOD PRESSURE: 136 MMHG

## 2019-01-01 VITALS
WEIGHT: 122 LBS | BODY MASS INDEX: 23.05 KG/M2 | DIASTOLIC BLOOD PRESSURE: 68 MMHG | SYSTOLIC BLOOD PRESSURE: 112 MMHG | RESPIRATION RATE: 18 BRPM | HEART RATE: 70 BPM

## 2019-01-01 VITALS
WEIGHT: 117 LBS | TEMPERATURE: 98.6 F | BODY MASS INDEX: 22.11 KG/M2 | HEART RATE: 82 BPM | RESPIRATION RATE: 18 BRPM | DIASTOLIC BLOOD PRESSURE: 62 MMHG | SYSTOLIC BLOOD PRESSURE: 104 MMHG

## 2019-01-01 DIAGNOSIS — J44.9 CHRONIC OBSTRUCTIVE BRONCHITIS (HCC): Chronic | ICD-10-CM

## 2019-01-01 DIAGNOSIS — I10 ESSENTIAL HYPERTENSION: Chronic | ICD-10-CM

## 2019-01-01 DIAGNOSIS — Z78.9 IMPAIRED MOBILITY AND ADLS: Chronic | ICD-10-CM

## 2019-01-01 DIAGNOSIS — Z74.09 IMPAIRED MOBILITY AND ADLS: Primary | Chronic | ICD-10-CM

## 2019-01-01 DIAGNOSIS — R54 AGE-RELATED PHYSICAL DEBILITY: Chronic | ICD-10-CM

## 2019-01-01 DIAGNOSIS — Z78.9 IMPAIRED MOBILITY AND ADLS: Primary | Chronic | ICD-10-CM

## 2019-01-01 DIAGNOSIS — R05.9 COUGH: Primary | ICD-10-CM

## 2019-01-01 DIAGNOSIS — R30.0 DYSURIA: Primary | ICD-10-CM

## 2019-01-01 DIAGNOSIS — R60.0 EDEMA OF HAND: Primary | ICD-10-CM

## 2019-01-01 DIAGNOSIS — I48.20 CHRONIC ATRIAL FIBRILLATION (HCC): ICD-10-CM

## 2019-01-01 DIAGNOSIS — F01.518 VASCULAR DEMENTIA WITH BEHAVIOR DISTURBANCE (HCC): Chronic | ICD-10-CM

## 2019-01-01 DIAGNOSIS — J44.9 CHRONIC OBSTRUCTIVE BRONCHITIS (HCC): ICD-10-CM

## 2019-01-01 DIAGNOSIS — I48.20 CHRONIC ATRIAL FIBRILLATION (HCC): Chronic | ICD-10-CM

## 2019-01-01 DIAGNOSIS — F01.518 VASCULAR DEMENTIA WITH BEHAVIOR DISTURBANCE (HCC): ICD-10-CM

## 2019-01-01 DIAGNOSIS — I10 ESSENTIAL HYPERTENSION: Primary | ICD-10-CM

## 2019-01-01 DIAGNOSIS — Z74.09 IMPAIRED MOBILITY AND ADLS: Chronic | ICD-10-CM

## 2019-01-01 DIAGNOSIS — J06.9 UPPER RESPIRATORY TRACT INFECTION, UNSPECIFIED TYPE: ICD-10-CM

## 2019-01-01 DIAGNOSIS — Z78.9 IMPAIRED MOBILITY AND ADLS: ICD-10-CM

## 2019-01-01 DIAGNOSIS — R54 AGE-RELATED PHYSICAL DEBILITY: ICD-10-CM

## 2019-01-01 DIAGNOSIS — Z74.09 IMPAIRED MOBILITY AND ADLS: ICD-10-CM

## 2019-01-01 DIAGNOSIS — M19.90 ARTHRITIS: ICD-10-CM

## 2019-01-01 DIAGNOSIS — I10 ESSENTIAL HYPERTENSION: Primary | Chronic | ICD-10-CM

## 2019-01-01 PROCEDURE — 99308 SBSQ NF CARE LOW MDM 20: CPT | Performed by: NURSE PRACTITIONER

## 2019-01-01 PROCEDURE — 99309 SBSQ NF CARE MODERATE MDM 30: CPT | Performed by: FAMILY MEDICINE

## 2019-01-01 PROCEDURE — 99309 SBSQ NF CARE MODERATE MDM 30: CPT | Performed by: NURSE PRACTITIONER

## 2019-01-01 RX ORDER — HYDROCODONE BITARTRATE AND ACETAMINOPHEN 5; 325 MG/1; MG/1
1 TABLET ORAL 2 TIMES DAILY
Qty: 120 TABLET | Refills: 0 | Status: SHIPPED | OUTPATIENT
Start: 2019-01-01 | End: 2019-01-01 | Stop reason: SDUPTHER

## 2019-01-01 RX ORDER — HYDROCODONE BITARTRATE AND ACETAMINOPHEN 5; 325 MG/1; MG/1
1 TABLET ORAL 2 TIMES DAILY
Qty: 120 TABLET | Refills: 0 | Status: SHIPPED | OUTPATIENT
Start: 2019-01-01 | End: 2020-01-01 | Stop reason: SDUPTHER

## 2019-01-10 RX ORDER — HYDROCODONE BITARTRATE AND ACETAMINOPHEN 5; 325 MG/1; MG/1
1 TABLET ORAL 2 TIMES DAILY
Qty: 120 TABLET | Refills: 0 | Status: SHIPPED | OUTPATIENT
Start: 2019-01-10 | End: 2019-02-06 | Stop reason: SDUPTHER

## 2019-01-16 ENCOUNTER — NURSING HOME (OUTPATIENT)
Dept: FAMILY MEDICINE CLINIC | Facility: CLINIC | Age: 84
End: 2019-01-16

## 2019-01-16 DIAGNOSIS — Z74.09 IMPAIRED MOBILITY AND ADLS: Primary | Chronic | ICD-10-CM

## 2019-01-16 DIAGNOSIS — R54 AGE-RELATED PHYSICAL DEBILITY: Chronic | ICD-10-CM

## 2019-01-16 DIAGNOSIS — I10 ESSENTIAL HYPERTENSION: Chronic | ICD-10-CM

## 2019-01-16 DIAGNOSIS — Z78.9 IMPAIRED MOBILITY AND ADLS: Primary | Chronic | ICD-10-CM

## 2019-01-16 DIAGNOSIS — I48.20 CHRONIC ATRIAL FIBRILLATION (HCC): Chronic | ICD-10-CM

## 2019-01-16 DIAGNOSIS — J44.9 CHRONIC OBSTRUCTIVE BRONCHITIS (HCC): Chronic | ICD-10-CM

## 2019-01-16 DIAGNOSIS — F01.518 VASCULAR DEMENTIA WITH BEHAVIOR DISTURBANCE (HCC): Chronic | ICD-10-CM

## 2019-01-16 PROCEDURE — 99309 SBSQ NF CARE MODERATE MDM 30: CPT | Performed by: FAMILY MEDICINE

## 2019-02-06 ENCOUNTER — NURSING HOME (OUTPATIENT)
Dept: FAMILY MEDICINE CLINIC | Facility: CLINIC | Age: 84
End: 2019-02-06

## 2019-02-06 DIAGNOSIS — Z74.09 IMPAIRED MOBILITY AND ADLS: Primary | Chronic | ICD-10-CM

## 2019-02-06 DIAGNOSIS — I48.20 CHRONIC ATRIAL FIBRILLATION (HCC): Chronic | ICD-10-CM

## 2019-02-06 DIAGNOSIS — I10 ESSENTIAL HYPERTENSION: Chronic | ICD-10-CM

## 2019-02-06 DIAGNOSIS — Z78.9 IMPAIRED MOBILITY AND ADLS: Primary | Chronic | ICD-10-CM

## 2019-02-06 DIAGNOSIS — J44.9 CHRONIC OBSTRUCTIVE BRONCHITIS (HCC): Chronic | ICD-10-CM

## 2019-02-06 DIAGNOSIS — R54 AGE-RELATED PHYSICAL DEBILITY: Chronic | ICD-10-CM

## 2019-02-06 DIAGNOSIS — F01.518 VASCULAR DEMENTIA WITH BEHAVIOR DISTURBANCE (HCC): Chronic | ICD-10-CM

## 2019-02-06 PROCEDURE — 99309 SBSQ NF CARE MODERATE MDM 30: CPT | Performed by: FAMILY MEDICINE

## 2019-02-06 RX ORDER — HYDROCODONE BITARTRATE AND ACETAMINOPHEN 5; 325 MG/1; MG/1
1 TABLET ORAL 2 TIMES DAILY
Qty: 120 TABLET | Refills: 0 | Status: SHIPPED | OUTPATIENT
Start: 2019-02-06 | End: 2019-04-04 | Stop reason: SDUPTHER

## 2019-02-27 ENCOUNTER — NURSING HOME (OUTPATIENT)
Dept: FAMILY MEDICINE CLINIC | Facility: CLINIC | Age: 84
End: 2019-02-27

## 2019-02-27 DIAGNOSIS — Z78.9 IMPAIRED MOBILITY AND ADLS: Primary | Chronic | ICD-10-CM

## 2019-02-27 DIAGNOSIS — R54 AGE-RELATED PHYSICAL DEBILITY: Chronic | ICD-10-CM

## 2019-02-27 DIAGNOSIS — F01.518 VASCULAR DEMENTIA WITH BEHAVIOR DISTURBANCE (HCC): Chronic | ICD-10-CM

## 2019-02-27 DIAGNOSIS — I48.20 CHRONIC ATRIAL FIBRILLATION (HCC): Chronic | ICD-10-CM

## 2019-02-27 DIAGNOSIS — I10 ESSENTIAL HYPERTENSION: Chronic | ICD-10-CM

## 2019-02-27 DIAGNOSIS — J44.9 CHRONIC OBSTRUCTIVE BRONCHITIS (HCC): Chronic | ICD-10-CM

## 2019-02-27 DIAGNOSIS — Z74.09 IMPAIRED MOBILITY AND ADLS: Primary | Chronic | ICD-10-CM

## 2019-02-27 PROCEDURE — 99308 SBSQ NF CARE LOW MDM 20: CPT | Performed by: FAMILY MEDICINE

## 2019-03-18 ENCOUNTER — NURSING HOME (OUTPATIENT)
Dept: FAMILY MEDICINE CLINIC | Facility: CLINIC | Age: 84
End: 2019-03-18

## 2019-03-18 DIAGNOSIS — F01.518 VASCULAR DEMENTIA WITH BEHAVIOR DISTURBANCE (HCC): Chronic | ICD-10-CM

## 2019-03-18 DIAGNOSIS — Z78.9 IMPAIRED MOBILITY AND ADLS: Chronic | ICD-10-CM

## 2019-03-18 DIAGNOSIS — I48.20 CHRONIC ATRIAL FIBRILLATION (HCC): Chronic | ICD-10-CM

## 2019-03-18 DIAGNOSIS — Z74.09 IMPAIRED MOBILITY AND ADLS: Chronic | ICD-10-CM

## 2019-03-18 DIAGNOSIS — I10 ESSENTIAL HYPERTENSION: Chronic | ICD-10-CM

## 2019-03-18 DIAGNOSIS — B35.9 TINEA: ICD-10-CM

## 2019-03-18 DIAGNOSIS — B37.31 CANDIDA VAGINITIS: ICD-10-CM

## 2019-03-18 PROCEDURE — 99309 SBSQ NF CARE MODERATE MDM 30: CPT | Performed by: NURSE PRACTITIONER

## 2019-03-18 NOTE — PROGRESS NOTES
"Nursing Home Follow Up Note      Tony Marc DO []   TESSA Bojorquez [x]  [address] [place] [address]  Phone: [phone]  Fax: [phone] Edvin Ugalde MD []    Tyrone Ratliff DO []   [address] [place] [address]  Phone: [phone]  Fax: [phone]     PATIENT NAME: Jaleesa Marinelli                                                                          YOB: 1929           DATE OF SERVICE: 03/18/2019  FACILITY:  [][facility]   [] [facility]   [] [facility]   [x] [facility]   [] Other ______________________________________________________________________    \" I personally performed the service documented in this note. The ancillary staff only assisted in transcribing the note and was not involved in any other part of rendering this service\".    CHIEF COMPLAINT:  Vaginal itching and rash in her skin folds    HISTORY OF PRESENT ILLNESS:   Family states patient has complained of vaginal itching over the past 3 days. She also has a rash under her breast folds which is irritating and itchy. She just recently  Completed an antibiotic,  several days ago, per nursing staff.     Management of chronic conditions: HTN, Afib, Dementia    PAST MEDICAL & SURGICAL HISTORY:   Past Medical History:   Diagnosis Date   • Atrial fibrillation (CMS/HCC)    • COPD (chronic obstructive pulmonary disease) (CMS/HCC)    • Hypertension       Past Surgical History:   Procedure Laterality Date   • HYSTERECTOMY           MEDICATIONS:  I have reviewed and reconciled the patients medication list in the patients chart at the skilled nursing facility today.      ALLERGIES:    Allergies   Allergen Reactions   • Macrobid [Nitrofurantoin Macrocrystal] Unknown (See Comments)     unknown         SOCIAL HISTORY:    Social History     Socioeconomic History   • Marital status:      Spouse name: Not on file   • Number of children: Not on file   • Years of education: Not on file   • Highest education " level: Not on file   Social Needs   • Financial resource strain: Not on file   • Food insecurity - worry: Not on file   • Food insecurity - inability: Not on file   • Transportation needs - medical: Not on file   • Transportation needs - non-medical: Not on file   Occupational History   • Not on file   Tobacco Use   • Smoking status: Never Smoker   Substance and Sexual Activity   • Alcohol use: No   • Drug use: No   • Sexual activity: Not on file   Other Topics Concern   • Not on file   Social History Narrative   • Not on file       FAMILY HISTORY:    No family history on file.    REVIEW OF SYSTEMS:    Review of Systems   Constitutional: Negative for activity change, appetite change, chills, diaphoresis, fatigue, fever, unexpected weight gain and unexpected weight loss.   HENT: Negative for congestion, ear pain, mouth sores, nosebleeds, postnasal drip, rhinorrhea, sinus pressure, sneezing, sore throat and trouble swallowing.    Eyes: Negative for blurred vision, pain, discharge, redness, itching and visual disturbance.   Respiratory: Negative for apnea, cough, choking, chest tightness, shortness of breath, wheezing and stridor.    Cardiovascular: Negative for chest pain, palpitations and leg swelling.   Gastrointestinal: Negative for abdominal distention, abdominal pain, blood in stool, constipation, diarrhea, nausea, vomiting, GERD and indigestion.   Endocrine: Negative for polydipsia and polyuria.   Genitourinary: Negative for urinary incontinence, decreased urine volume, difficulty urinating, dysuria, flank pain, frequency, hematuria and urgency.   Musculoskeletal: Positive for arthralgias. Negative for back pain, gait problem, joint swelling and myalgias.   Skin: Positive for rash (beneath bilateral breasts). Negative for color change, dry skin and skin lesions.   Allergic/Immunologic: Negative for environmental allergies.   Neurological: Positive for memory problem and confusion. Negative for dizziness,  seizures, speech difficulty and weakness.   Psychiatric/Behavioral: Negative for behavioral problems, dysphoric mood, hallucinations, sleep disturbance and depressed mood. The patient is not nervous/anxious.          PHYSICAL EXAMINATION:   VITAL SIGNS: BP: 124/72,  HR: 74,    RR:18,    02: ,    T: 98,    Wt: 127    Physical Exam   Constitutional: She appears well-developed and well-nourished.   HENT:   Head: Normocephalic.   Right Ear: External ear normal.   Left Ear: External ear normal.   Nose: Nose normal.   Eyes: Conjunctivae are normal.   Neck: Normal range of motion.   Cardiovascular: Normal rate, regular rhythm and intact distal pulses.   Pulmonary/Chest: Effort normal and breath sounds normal. No respiratory distress. She has no wheezes. She has no rales.   Abdominal: Soft. Bowel sounds are normal. She exhibits no distension and no mass. There is no tenderness. No hernia.   Musculoskeletal: She exhibits tenderness. She exhibits no edema.   Neurological: She is alert.   Skin: Skin is warm and dry. No rash noted.   Erythematous rash beneath bilateral breast folds   Psychiatric: She has a normal mood and affect. Her behavior is normal.   Nursing note and vitals reviewed.      RECORDS REVIEW:   I have reviewed and interpreted the following labs at today's visit: no new labs since previous visit    ASSESSMENT     Diagnoses and all orders for this visit:    Candida vaginitis    Tinea    Essential hypertension    Chronic atrial fibrillation (CMS/HCC)    Vascular dementia with behavior disturbance    Impaired mobility and ADLs        PLAN  Diflucan 150 mg PO once, prescribed today, for treatment of her vaginal candidiasis. Staff may repeat dose in 5 days, if still symptomatic. Ketoconazole powder started today, for treatment of her tinea rash. Apply to affected areas BID X14 days.     HTN at goal on current medications.     Afib controlled with current medications.     Dementia symptoms stable with no acute  behavior changes. Appetite and weight stable.    Staff to continue supportive care for all ADLs.     Patient, daughter and staff,  encouraged to keep me informed of any acute changes, lack of improvement, or any new concerning symptoms.       [x]  Discussed Patient in detail with nursing/staff, addressed all needs today.     [x]  Plan of Care Reviewed   []  PT/OT Reviewed   [x]  Order Changes  []  Discharge Plans Reviewed  [x]  Advance Directive on file with Nursing Home.   [x]  POA on file with Nursing Home.   [x]  Code Status listed: []  Full Code   [x]  DNR          Total face to face time spent with patient 10 minutes.  Of which  7 where in counseling the patient and family.

## 2019-03-27 ENCOUNTER — NURSING HOME (OUTPATIENT)
Dept: FAMILY MEDICINE CLINIC | Facility: CLINIC | Age: 84
End: 2019-03-27

## 2019-03-27 DIAGNOSIS — Z78.9 IMPAIRED MOBILITY AND ADLS: Primary | Chronic | ICD-10-CM

## 2019-03-27 DIAGNOSIS — I10 ESSENTIAL HYPERTENSION: Chronic | ICD-10-CM

## 2019-03-27 DIAGNOSIS — Z74.09 IMPAIRED MOBILITY AND ADLS: Primary | Chronic | ICD-10-CM

## 2019-03-27 DIAGNOSIS — I48.20 CHRONIC ATRIAL FIBRILLATION (HCC): Chronic | ICD-10-CM

## 2019-03-27 DIAGNOSIS — F01.518 VASCULAR DEMENTIA WITH BEHAVIOR DISTURBANCE (HCC): Chronic | ICD-10-CM

## 2019-03-27 DIAGNOSIS — J44.9 CHRONIC OBSTRUCTIVE BRONCHITIS (HCC): Chronic | ICD-10-CM

## 2019-03-27 DIAGNOSIS — R54 AGE-RELATED PHYSICAL DEBILITY: Chronic | ICD-10-CM

## 2019-03-27 PROCEDURE — 99309 SBSQ NF CARE MODERATE MDM 30: CPT | Performed by: FAMILY MEDICINE

## 2019-03-29 NOTE — PROGRESS NOTES
Nursing Home Progress Note      Patient Name: Jaleesa Marinelli   YOB: 1929    Date of Service: 10/31/2018     Tony Marc,  [x]  Dominique Oliveira, TESSA ?  [address] [place] [address]  Phone: [phone]  Fax: [phone] Edvin Ugalde MD ?  Tyrone Ratliff, DO ?  [address] [place] [address]  Phone: [phone]  Fax: [phone]       Facility: [facility] []   [facility] []   [facility] []   [facility] [x]   Other []       CHIEF COMPLAINT:  CMM/LTC     HISTORY OF PRESENT ILLNESS:  [x]  Follow Up visit for coordination of long term care issues and chronic medical management needs.    Vascular dementia with behavioral disturbance/impaired mobility and ADLs/age-related physical debility/chronic A. fib/COPD/hypertension    PAST MEDICAL & SURGICAL HISTORY:  Past Medical History:   Diagnosis Date   • Atrial fibrillation (CMS/HCC)    • COPD (chronic obstructive pulmonary disease) (CMS/HCC)    • Hypertension       Past Surgical History:   Procedure Laterality Date   • HYSTERECTOMY          MEDICATIONS:  Medication administration record for Jaleesa Marinelli reviewed and reconciled today    ALLERGIES:  Allergies   Allergen Reactions   • Macrobid [Nitrofurantoin Macrocrystal] Unknown (See Comments)     unknown        REVIEW OF SYSTEMS:  • Appetite: Fair [x]   Good []   Poor []   Weight Loss []  [x]  Weight Stable   Unavoidable Weight Loss []  Tolerating Tube Feeding []    Supplements Provided []   • Constitutional: Negative for fever, chills, diaphoresis or fatigue and weight change.   • HENT: No dysphagia; no changes to vision/hearing/smell/taste; no epistaxis  • Eyes: Negative for redness and visual disturbance.   • Respiratory: Negative for shortness of breath, chest pain, cough or chest tightness.   • Cardiovascular: Negative for chest pain and palpitations.   • Gastrointestinal: Negative for abdominal distention, abdominal pain and blood in stool.   • Endocrine: Negative for cold  intolerance and heat intolerance.   • Genitourinary: Negative for difficulty urinating, dysuria and frequency.Negative for hematuria   • Musculoskeletal: Chronic myalgias and arthralgias  • Integumentary: No open wounds, rash or concerning skin lesions  • Neurological: Negative for syncope, weakness and headaches.   • Hematological: Negative for adenopathy. Does not bruise/bleed easily.   • Immunological: Negative for reported allergies or immunological disorders  • Psychological: Occasional increased anxiety    PHYSICAL EXAMINATION:  VITAL SIGNS: /74, HR 68 bpm, RR 18, weight 124    General Appearance:  [x]  Alert   [x]  Oriented x person  [x]  No acute distress, chronically ill-appearing female    [x]  Confused  []  Disoriented   []  Comatose   Head:  Atraumatic and normocephalic, without obvious abnormality.   Eyes:          PERRLA, conjunctivae and sclerae normal, no Icterus. No pallor. Extra-occular movements are within normal limits.   Ears:  Ears appear intact with no abnormalities noted.   Throat: No oral lesions, no thrush, oral mucosa moist.   Neck: Supple, trachea midline, no thyromegaly, no carotid bruit.   Back:   No kyphoscoliosis. No tenderness to palpation.   Lungs:   Chest shape is normal. Breath sounds heard bilaterally equally.  No wheezing.  Bilateral basal crackles heard.    Heart:  Normal S1 and S2, no murmur, no gallop, no rub. No JVD.   Abdomen:   Normal bowel sounds, no masses, no organomegaly. Soft, non-tender, non-distended, no guarding    Extremities: Moves all extremities well, edema, cyanosis or clubbing.  Frail build.    Pulses: Pulses palpable and equal bilaterally.   Skin: No bleeding or rash.  Generalized dry skin noted.  Age-related atrophy of skin.   Neurologic: [x] Normal speech []  Normal mental status    [x] Cranial nerves II through XII intact   [x]  No anosmia [x]  DTR 2+ [x]  Proprioception intact  []  No focal motor/sensory deficits     Psych/Mood:        [x]  No  acute changes []  Depressed  Urinary:        []  Continent  [x]  Incontinent  []  Retention  []  F/C     []  UTI w/treatment in progress  RECORD REVIEW:   • Consult notes []   • Admission and subsequent notes []   •  [x] I have personally reviewed and interpreted available lab data, radiology studies and ECG obtained at time of visit.  [x] Medication Review: I have reviewed the patients active and prn medications at [facility]     ASSESSMENT   Diagnoses and all orders for this visit:    Impaired mobility and ADLs    Age-related physical debility    Chronic atrial fibrillation (CMS/[facility])    Essential hypertension    Vascular dementia with behavior disturbance    Chronic obstructive pulmonary disease, unspecified COPD type (CMS/[facility])        PLAN  Continue supportive care as needed for ADLs, as well as mobilization.  Patient's outburst of increased anxiety continues periodically.  Blood pressure is at goal, heart rate well controlled, patient remains hemodynamically asymptomatic.  Continue other medications at current dosing and frequency.  Surveillance labs when needed.  I suspect patient's cognitive reserve is depleted, contributing to her periodic/persistent behaviors, continues to be severe at times.  Noted relative weight stability, approximate 1 pound gain.    [x]  Discussed Patient in detail with nursing/staff, addressed all needs today.     [x]  Plan of Care Reviewed   []   PT/OT Reviewed   []  Order Changes  []   Discharge Plans Reviewed         Total face to face time spent with patient 25 minutes, 15 min in counseling.    Tony Marc  10/31/2018

## 2019-04-04 PROBLEM — J44.89 CHRONIC OBSTRUCTIVE BRONCHITIS: Chronic | Status: ACTIVE | Noted: 2018-07-09

## 2019-04-04 RX ORDER — HYDROCODONE BITARTRATE AND ACETAMINOPHEN 5; 325 MG/1; MG/1
1 TABLET ORAL 2 TIMES DAILY
Qty: 120 TABLET | Refills: 0 | Status: SHIPPED | OUTPATIENT
Start: 2019-04-04 | End: 2019-06-03 | Stop reason: SDUPTHER

## 2019-04-04 NOTE — PROGRESS NOTES
Nursing Home Progress Note      Patient Name: Jaleesa Marinelli   YOB: 1929    Date of Service: 11/7/2018     Tony Marc,  [x]  Dominique Oliveira, TESSA ?  [address] [place] [address]  Phone: [phone]  Fax: [phone] Edvin Ugalde MD ?  Tyrone Ratliff, DO ?  [address] [place] [address]  Phone: [phone]  Fax: [phone]       Facility: [facility] []   [facility] []   [facility] []   [facility] [x]   Other []       CHIEF COMPLAINT:  CMM/LTC     HISTORY OF PRESENT ILLNESS:  [x]  Follow Up visit for coordination of long term care issues and chronic medical management needs.    Vascular dementia with behavioral disturbance/impaired mobility and ADLs/age-related physical debility/chronic A. fib/COPD/hypertension    PAST MEDICAL & SURGICAL HISTORY:  Past Medical History:   Diagnosis Date   • Atrial fibrillation (CMS/HCC)    • COPD (chronic obstructive pulmonary disease) (CMS/HCC)    • Hypertension       Past Surgical History:   Procedure Laterality Date   • HYSTERECTOMY          MEDICATIONS:  Medication administration record for Jaleesa Marinelli reviewed and reconciled today    ALLERGIES:  Allergies   Allergen Reactions   • Macrobid [Nitrofurantoin Macrocrystal] Unknown (See Comments)     unknown        REVIEW OF SYSTEMS:  • Appetite: Fair [x]   Good []   Poor []   Weight Loss []  [x]  Weight Stable   Unavoidable Weight Loss []  Tolerating Tube Feeding []    Supplements Provided []   • Constitutional: Negative for fever, chills, diaphoresis or fatigue and weight change.   • HENT: No dysphagia; no changes to vision/hearing/smell/taste; no epistaxis  • Eyes: Negative for redness and visual disturbance.   • Respiratory: Negative for shortness of breath, chest pain, cough or chest tightness.   • Cardiovascular: Negative for chest pain and palpitations.   • Gastrointestinal: Negative for abdominal distention, abdominal pain and blood in stool.   • Endocrine: Negative for cold  intolerance and heat intolerance.   • Genitourinary: Negative for difficulty urinating, dysuria and frequency.Negative for hematuria   • Musculoskeletal: Chronic myalgias and arthralgias  • Integumentary: No open wounds, rash or concerning skin lesions  • Neurological: Negative for syncope, weakness and headaches.   • Hematological: Negative for adenopathy. Does not bruise/bleed easily.   • Immunological: Negative for reported allergies or immunological disorders  • Psychological: Occasional increased anxiety    PHYSICAL EXAMINATION:  VITAL SIGNS: /74, HR 70 bpm, RR 16, weight 128    General Appearance:  [x]  Alert   [x]  Oriented x person  [x]  No acute distress, chronically ill-appearing female    [x]  Confused  []  Disoriented   []  Comatose   Head:  Atraumatic and normocephalic, without obvious abnormality.   Eyes:          PERRLA, conjunctivae and sclerae normal, no Icterus. No pallor. Extra-occular movements are within normal limits.   Ears:  Ears appear intact with no abnormalities noted.   Throat: No oral lesions, no thrush, oral mucosa moist.   Neck: Supple, trachea midline, no thyromegaly, no carotid bruit.   Back:   No kyphoscoliosis. No tenderness to palpation.   Lungs:   Chest shape is normal. Breath sounds heard bilaterally equally.  No wheezing.  Bilateral basal crackles heard.    Heart:  Normal S1 and S2, no murmur, no gallop, no rub. No JVD.   Abdomen:   Normal bowel sounds, no masses, no organomegaly. Soft, non-tender, non-distended, no guarding    Extremities: Moves all extremities well, edema, cyanosis or clubbing.  Frail build.    Pulses: Pulses palpable and equal bilaterally.   Skin: No bleeding or rash.  Generalized dry skin noted.  Age-related atrophy of skin.   Neurologic: [x] Normal speech []  Normal mental status    [x] Cranial nerves II through XII intact   [x]  No anosmia [x]  DTR 2+ [x]  Proprioception intact  []  No focal motor/sensory deficits     Psych/Mood:        [x]  No  acute changes []  Depressed  Urinary:        []  Continent  [x]  Incontinent  []  Retention  []  F/C     []  UTI w/treatment in progress  RECORD REVIEW:   • Consult notes []   • Admission and subsequent notes []   •  [x] I have personally reviewed and interpreted available lab data, radiology studies and ECG obtained at time of visit.  [x] Medication Review: I have reviewed the patients active and prn medications at Skilled Nursing Facility     ASSESSMENT   Diagnoses and all orders for this visit:    Impaired mobility and ADLs    Age-related physical debility    Chronic atrial fibrillation (CMS/HCC)    Essential hypertension    Chronic obstructive bronchitis (CMS/HCC)    Vascular dementia with behavior disturbance        PLAN  Continue supportive care as needed for ADLs, as well as mobilization/transfers.  Patient's outburst of increased anxiety continues periodically, of seeming chronic nature.  BP is at goal, HR well controlled, patient remains clinically hemodynamically asymptomatic.  Continue other medications at current dosing and frequency.  Continue surveillance labs.  Patient's cognitive reserve is nearly depleted, this contributes to her periodic/persistent behaviors, being severe at times.  Consistent appetite, noted weight stability, approximate 4 pound gain.    [x]  Discussed Patient in detail with nursing/staff, addressed all needs today.     [x]  Plan of Care Reviewed   []   PT/OT Reviewed   []  Order Changes  []   Discharge Plans Reviewed         Total face to face time spent with patient 25 minutes, 15 min in counseling.    Tony Marc  11/7/2018

## 2019-05-20 ENCOUNTER — NURSING HOME (OUTPATIENT)
Dept: FAMILY MEDICINE CLINIC | Facility: CLINIC | Age: 84
End: 2019-05-20

## 2019-05-20 DIAGNOSIS — I48.20 CHRONIC ATRIAL FIBRILLATION (HCC): Chronic | ICD-10-CM

## 2019-05-20 DIAGNOSIS — B37.2 CANDIDAL SKIN INFECTION: ICD-10-CM

## 2019-05-20 DIAGNOSIS — I10 ESSENTIAL HYPERTENSION: Chronic | ICD-10-CM

## 2019-05-20 DIAGNOSIS — Z78.9 IMPAIRED MOBILITY AND ADLS: Chronic | ICD-10-CM

## 2019-05-20 DIAGNOSIS — J30.89 ENVIRONMENTAL AND SEASONAL ALLERGIES: Primary | ICD-10-CM

## 2019-05-20 DIAGNOSIS — F01.518 VASCULAR DEMENTIA WITH BEHAVIOR DISTURBANCE (HCC): Chronic | ICD-10-CM

## 2019-05-20 DIAGNOSIS — Z74.09 IMPAIRED MOBILITY AND ADLS: Chronic | ICD-10-CM

## 2019-05-20 PROCEDURE — 99309 SBSQ NF CARE MODERATE MDM 30: CPT | Performed by: NURSE PRACTITIONER

## 2019-05-20 NOTE — PROGRESS NOTES
"Nursing Home Follow Up Note      Tony Marc DO []   TESSA Bojorquez [x]  [address] [place] [address]  Phone: [phone]  Fax: [phone] Edvin Ugalde MD []    Tyrone Ratliff DO []   [address] [place] [address]  Phone: [phone]  Fax: [phone]     PATIENT NAME: Jaleesa Marinelli                                                                          YOB: 1929           DATE OF SERVICE: 05/20/2019  FACILITY:  [][facility]   [] [facility]   [] [facility]   [x] [facility]   [] Other ______________________________________________________________________    \" I personally performed the service documented in this note. The ancillary staff only assisted in transcribing the note and was not involved in any other part of rendering this service\".    CHIEF COMPLAINT:  Allergies, PND, cough    Rash under breasts and erich axilla     HISTORY OF PRESENT ILLNESS:   Patient with worsening allergies, PND and cough. Family would like something more for allergies.     Per staff, patient has yeast like rash under breasts and axilla. Family is applying an antifungal powder OTC but it is not helping.    Management of chronic conditions: HTN, Afib, Dementia    PAST MEDICAL & SURGICAL HISTORY:   Past Medical History:   Diagnosis Date   • Atrial fibrillation (CMS/HCC)    • COPD (chronic obstructive pulmonary disease) (CMS/HCC)    • Hypertension       Past Surgical History:   Procedure Laterality Date   • HYSTERECTOMY           MEDICATIONS:  I have reviewed and reconciled the patients medication list in the patients chart at the skilled nursing facility today.      ALLERGIES:    Allergies   Allergen Reactions   • Macrobid [Nitrofurantoin Macrocrystal] Unknown (See Comments)     unknown         SOCIAL HISTORY:    Social History     Socioeconomic History   • Marital status:      Spouse name: Not on file   • Number of children: Not on file   • Years of education: Not on file   • Highest " education level: Not on file   Tobacco Use   • Smoking status: Never Smoker   Substance and Sexual Activity   • Alcohol use: No   • Drug use: No       FAMILY HISTORY:    No family history on file.    REVIEW OF SYSTEMS:    Review of Systems   Constitutional: Negative for activity change, appetite change, chills, diaphoresis, fatigue, fever, unexpected weight gain and unexpected weight loss.   HENT: Negative for congestion, ear pain, mouth sores, nosebleeds, postnasal drip, rhinorrhea, sinus pressure, sneezing, sore throat and trouble swallowing.    Eyes: Negative for blurred vision, pain, discharge, redness, itching and visual disturbance.   Respiratory: Negative for apnea, cough, choking, chest tightness, shortness of breath, wheezing and stridor.    Cardiovascular: Negative for chest pain, palpitations and leg swelling.   Gastrointestinal: Negative for abdominal distention, abdominal pain, blood in stool, constipation, diarrhea, nausea, vomiting, GERD and indigestion.   Endocrine: Negative for polydipsia and polyuria.   Genitourinary: Negative for urinary incontinence, decreased urine volume, difficulty urinating, dysuria, flank pain, frequency, hematuria and urgency.   Musculoskeletal: Positive for arthralgias. Negative for back pain, gait problem, joint swelling and myalgias.   Skin: Positive for rash (beneath bilateral breasts). Negative for color change, dry skin and skin lesions.   Allergic/Immunologic: Negative for environmental allergies.   Neurological: Positive for memory problem and confusion. Negative for dizziness, seizures, speech difficulty and weakness.   Psychiatric/Behavioral: Negative for behavioral problems, dysphoric mood, hallucinations, sleep disturbance and depressed mood. The patient is not nervous/anxious.      Exam re-performed and the findings are noted.    PHYSICAL EXAMINATION:   VITAL SIGNS: BP: 118/70,  HR: 76    RR:18,    02: 94% ,    T: 97.9,    Wt: 124    Physical Exam    Constitutional: She appears well-developed and well-nourished.   HENT:   Head: Normocephalic.   Right Ear: External ear normal.   Left Ear: External ear normal.   Nose: Nose normal.   Eyes: Conjunctivae are normal.   Neck: Normal range of motion.   Cardiovascular: Normal rate, regular rhythm and intact distal pulses.   Pulmonary/Chest: Effort normal and breath sounds normal. No respiratory distress. She has no wheezes. She has no rales.   Abdominal: Soft. Bowel sounds are normal. She exhibits no distension and no mass. There is no tenderness. No hernia.   Musculoskeletal: She exhibits tenderness. She exhibits no edema.   Neurological: She is alert.   Skin: Skin is warm and dry. No rash noted.   Erythematous rash beneath bilateral breast folds   Psychiatric: She has a normal mood and affect. Her behavior is normal.   Nursing note and vitals reviewed.    Exam re-performed and the findings are noted.    RECORDS REVIEW:   I have reviewed and interpreted the following labs at today's visit: no new labs since previous visit    ASSESSMENT     Diagnoses and all orders for this visit:    Environmental and seasonal allergies    Candidal skin infection    Essential hypertension    Chronic atrial fibrillation (CMS/HCC)    Vascular dementia with behavior disturbance    Impaired mobility and ADLs        PLAN  Singular started today, for better control of her allergies. Nebs and Robitussin scheduled q 6 hours for 1 week, until cough improves.      Nystatin powder prescribed for treatment of her candidal rash. Apply to affected areas BID X14 days.     HTN at goal on current medications.     Afib controlled with current medications.     Dementia symptoms stable with no acute behavior changes. Appetite and weight stable.    Staff to continue supportive care for all ADLs.     Patient, daughter and staff,  encouraged to keep me informed of any acute changes, lack of improvement, or any new concerning symptoms.       [x]  Discussed  Patient in detail with nursing/staff, addressed all needs today.     [x]  Plan of Care Reviewed   []  PT/OT Reviewed   [x]  Order Changes  []  Discharge Plans Reviewed  [x]  Advance Directive on file with Nursing Home.   [x]  POA on file with Nursing Home.   [x]  Code Status listed: []  Full Code   [x]  DNR          Total face to face time spent with patient 10 minutes.  Of which  7 where in counseling the patient and family.

## 2019-06-03 RX ORDER — HYDROCODONE BITARTRATE AND ACETAMINOPHEN 5; 325 MG/1; MG/1
1 TABLET ORAL 2 TIMES DAILY
Qty: 120 TABLET | Refills: 0 | Status: SHIPPED | OUTPATIENT
Start: 2019-06-03 | End: 2019-07-23 | Stop reason: SDUPTHER

## 2019-06-19 NOTE — PROGRESS NOTES
Nursing Home Progress Note      Patient Name: Jaleesa Marinelli   YOB: 1929    Date of Service: 1/16/2019    Tony Marc,  [x]  Dominique Oliveira, TESSA ?  [address] [place] [address]  Phone: [phone]  Fax: [phone] Edvin Ugalde MD ?  Tyrone Ratliff, DO ?  [address] [place] [address]  Phone: [phone]  Fax: [phone]       Facility: [facility] []   [facility] []   [facility] []   [facility] [x]   Other []       CHIEF COMPLAINT:  CMM/LTC     HISTORY OF PRESENT ILLNESS:  [x]  Follow Up visit for coordination of long term care issues and chronic medical management needs.    Vascular dementia with behavioral disturbance/impaired mobility and ADLs/age-related physical debility/chronic A. fib/COPD/hypertension    PAST MEDICAL & SURGICAL HISTORY:  Past Medical History:   Diagnosis Date   • Atrial fibrillation (CMS/HCC)    • COPD (chronic obstructive pulmonary disease) (CMS/HCC)    • Hypertension       Past Surgical History:   Procedure Laterality Date   • HYSTERECTOMY          MEDICATIONS:  Medication administration record for Jaleesa Marinelli reviewed and reconciled today    ALLERGIES:  Allergies   Allergen Reactions   • Macrobid [Nitrofurantoin Macrocrystal] Unknown (See Comments)     unknown        REVIEW OF SYSTEMS:  • Appetite: Fair [x]   Good []   Poor []   Weight Loss []  [x]  Weight Stable   Unavoidable Weight Loss []  Tolerating Tube Feeding []    Supplements Provided []   • Constitutional: Negative for fever, chills, diaphoresis or fatigue and weight change.   • HENT: No dysphagia; no changes to vision/hearing/smell/taste; no epistaxis  • Eyes: Negative for redness and visual disturbance.   • Respiratory: Negative for shortness of breath, chest pain, cough or chest tightness.   • Cardiovascular: Negative for chest pain and palpitations.   • Gastrointestinal: Negative for abdominal distention, abdominal pain and blood in stool.   • Endocrine: Negative for cold  intolerance and heat intolerance.   • Genitourinary: Negative for difficulty urinating, dysuria and frequency.Negative for hematuria   • Musculoskeletal: Chronic myalgias and arthralgias  • Integumentary: No open wounds, rash or concerning skin lesions  • Neurological: Negative for syncope, weakness and headaches.   • Hematological: Negative for adenopathy. Does not bruise/bleed easily.   • Immunological: Negative for reported allergies or immunological disorders  • Psychological: Occasional increased anxiety    PHYSICAL EXAMINATION:  VITAL SIGNS: /70, HR 76 bpm, RR 18, weight 126    General Appearance:  [x]  Alert   [x]  Oriented x person  [x]  No acute distress, chronically ill-appearing female    [x]  Confused  []  Disoriented   []  Comatose   Head:  Atraumatic and normocephalic, without obvious abnormality.   Eyes:          PERRLA, conjunctivae and sclerae normal, no Icterus. No pallor. Extra-occular movements are within normal limits.   Ears:  Ears appear intact with no abnormalities noted.   Throat: No oral lesions, no thrush, oral mucosa moist.   Neck: Supple, trachea midline, no thyromegaly, no carotid bruit.   Back:   No kyphoscoliosis. No tenderness to palpation.   Lungs:   Chest shape is normal. Breath sounds heard bilaterally equally.  No wheezing.  Bilateral basal crackles heard.    Heart:  Normal S1 and S2, no murmur, no gallop, no rub. No JVD.   Abdomen:   Normal bowel sounds, no masses, no organomegaly. Soft, non-tender, non-distended, no guarding    Extremities: Moves all extremities well, edema, cyanosis or clubbing.  Frail build.    Pulses: Pulses palpable and equal bilaterally.   Skin: No bleeding or rash.  Generalized dry skin noted.  Age-related atrophy of skin.   Neurologic: [x] Normal speech []  Normal mental status    [x] Cranial nerves II through XII intact   [x]  No anosmia [x]  DTR 2+ [x]  Proprioception intact  []  No focal motor/sensory deficits     Psych/Mood:        [x]  No  acute changes []  Depressed  Urinary:        []  Continent  [x]  Incontinent  []  Retention  []  F/C     []  UTI w/treatment in progress  RECORD REVIEW:   • Consult notes []   • Admission and subsequent notes []   •  [x] I have personally reviewed and interpreted available lab data, radiology studies and ECG obtained at time of visit.  [x] Medication Review: I have reviewed the patients active and prn medications at Skilled Nursing Facility     ASSESSMENT   Diagnoses and all orders for this visit:    Impaired mobility and ADLs    Age-related physical debility    Vascular dementia with behavior disturbance    Chronic obstructive bronchitis (CMS/[facility])    Chronic atrial fibrillation (CMS/[facility])    Essential hypertension        PLAN  Continue supportive care as needed for ADLs, as well as mobilization/transfers.  Patient's outburst of increased anxiety continue periodically, of seemingly chronic nature.  BP remains at goal, HR well controlled, patient continues to be clinically hemodynamically asymptomatic.  Continue other medications at current dosing and frequency, no need for changes.  Continue surveillance labs when needed.  Patient's cognitive reserve continues to decline, this only contributes to her periodic/persistent behaviors, continues to be severe at times.  Consistent appetite remains, noted to have weight stability with only a 2 pound change.    [x]  Discussed Patient in detail with nursing/staff, addressed all needs today.     [x]  Plan of Care Reviewed   []   PT/OT Reviewed   []  Order Changes  []   Discharge Plans Reviewed         Total face to face time spent with patient 25 minutes, 15 min in counseling.    Tony Marc  1/16/2019

## 2019-07-10 NOTE — PROGRESS NOTES
Nursing Home Progress Note      Patient Name: Jaleesa Marinelli   YOB: 1929    Date of Service: 2/6/2019    Tony Marc,  [x]  Dominique Oliveira, TESSA ?  [address] [place] [address]  Phone: [phone]  Fax: [phone] Edvin Ugalde MD ?  Tyrone Ratliff, DO ?  [address] [place] [address]  Phone: [phone]  Fax: [phone]       Facility: [facility] []   [facility] []   [facility] []   [facility] [x]   Other []       CHIEF COMPLAINT:  CMM/LTC     HISTORY OF PRESENT ILLNESS:  [x]  Follow Up visit for coordination of long term care issues and chronic medical management needs.    Vascular dementia with behavioral disturbance/impaired mobility and ADLs/age-related physical debility/chronic A. fib/COPD/hypertension    PAST MEDICAL & SURGICAL HISTORY:  Past Medical History:   Diagnosis Date   • Atrial fibrillation (CMS/HCC)    • COPD (chronic obstructive pulmonary disease) (CMS/HCC)    • Hypertension       Past Surgical History:   Procedure Laterality Date   • HYSTERECTOMY          MEDICATIONS:  Medication administration record for Jaleesa Marinelli reviewed and reconciled today    ALLERGIES:  Allergies   Allergen Reactions   • Macrobid [Nitrofurantoin Macrocrystal] Unknown (See Comments)     unknown        REVIEW OF SYSTEMS:  • Appetite: Fair [x]   Good []   Poor []   Weight Loss []  [x]  Weight Stable   Unavoidable Weight Loss []  Tolerating Tube Feeding []    Supplements Provided []   • Constitutional: Negative for fever, chills, diaphoresis or fatigue and weight change.   • HENT: No dysphagia; no changes to vision/hearing/smell/taste; no epistaxis  • Eyes: Negative for redness and visual disturbance.   • Respiratory: Negative for shortness of breath, chest pain, cough or chest tightness.   • Cardiovascular: Negative for chest pain and palpitations.   • Gastrointestinal: Negative for abdominal distention, abdominal pain and blood in stool.   • Endocrine: Negative for cold  intolerance and heat intolerance.   • Genitourinary: Negative for difficulty urinating, dysuria and frequency.Negative for hematuria   • Musculoskeletal: Chronic myalgias and arthralgias  • Integumentary: No open wounds, rash or concerning skin lesions  • Neurological: Negative for syncope, weakness and headaches.   • Hematological: Negative for adenopathy. Does not bruise/bleed easily.   • Immunological: Negative for reported allergies or immunological disorders  • Psychological: Occasional increased anxiety    PHYSICAL EXAMINATION:  VITAL SIGNS: /60, HR 80 bpm, RR 20, weight 125    General Appearance:  [x]  Alert   [x]  Oriented x person  [x]  No acute distress, chronically ill-appearing female    [x]  Confused  []  Disoriented   []  Comatose   Head:  Atraumatic and normocephalic, without obvious abnormality.   Eyes:          PERRLA, conjunctivae and sclerae normal, no Icterus. No pallor. Extra-occular movements are within normal limits.   Ears:  Ears appear intact with no abnormalities noted.   Throat: No oral lesions, no thrush, oral mucosa moist.   Neck: Supple, trachea midline, no thyromegaly, no carotid bruit.   Back:   No kyphoscoliosis. No tenderness to palpation.   Lungs:   Chest shape is normal. Breath sounds heard bilaterally equally.  No wheezing.  Bilateral basal crackles heard.    Heart:  Normal S1 and S2, no murmur, no gallop, no rub. No JVD.   Abdomen:   Normal bowel sounds, no masses, no organomegaly. Soft, non-tender, non-distended, no guarding    Extremities: Moves all extremities well, edema, cyanosis or clubbing.  Frail build.    Pulses: Pulses palpable and equal bilaterally.   Skin: No bleeding or rash.  Generalized dry skin noted.  Age-related atrophy of skin.   Neurologic: [x] Normal speech []  Normal mental status    [x] Cranial nerves II through XII intact   [x]  No anosmia [x]  DTR 2+ [x]  Proprioception intact  []  No focal motor/sensory deficits     Psych/Mood:        [x]  No  acute changes []  Depressed  Urinary:        []  Continent  [x]  Incontinent  []  Retention  []  F/C     []  UTI w/treatment in progress  RECORD REVIEW:   • Consult notes []   • Admission and subsequent notes []   •  [x] I have personally reviewed and interpreted available lab data, radiology studies and ECG obtained at time of visit.  [x] Medication Review: I have reviewed the patients active and prn medications at Skilled Nursing Facility     ASSESSMENT   Diagnoses and all orders for this visit:    Impaired mobility and ADLs    Age-related physical debility    Vascular dementia with behavior disturbance    Chronic obstructive bronchitis (CMS/[facility])    Chronic atrial fibrillation (CMS/[facility])    Essential hypertension        PLAN  Planned continuation of supportive care as needed for ADLs, as well as mobilization/transfers issues.  Periodic outburst of increased anxiety continue, of seemingly chronic nature.  BP is at goal, HR well controlled, patient continues to be hemodynamically asymptomatic.  Continue other medications at current dosing and frequency, no need for changes at this time.  Continue surveillance labs.  Patient's cognitive reserve continues expected decline, contributes to her periodic/persistent behaviors, severe at times.  Consistent appetite, noted weight stability without significant change.    [x]  Discussed Patient in detail with nursing/staff, addressed all needs today.     [x]  Plan of Care Reviewed   []   PT/OT Reviewed   []  Order Changes  []   Discharge Plans Reviewed         Total face to face time spent with patient 25 minutes, 15 min in counseling.    Tony Marc  2/6/2019

## 2019-07-23 RX ORDER — HYDROCODONE BITARTRATE AND ACETAMINOPHEN 5; 325 MG/1; MG/1
1 TABLET ORAL 2 TIMES DAILY
Qty: 120 TABLET | Refills: 0 | Status: SHIPPED | OUTPATIENT
Start: 2019-07-23 | End: 2019-01-01 | Stop reason: SDUPTHER

## 2019-08-21 NOTE — PROGRESS NOTES
Nursing Home Progress Note      Patient Name: Jaleesa Marinelli   YOB: 1929    Date of Service: 2/27/2019    Tony Marc,  [x]  Dominique Oliveira, TESSA ?  [address] [place] [address]  Phone: [phone]  Fax: [phone] Edvin Ugalde MD ?  Tyrone Ratliff, DO ?  [address] [place] [address]  Phone: [phone]  Fax: [phone]       Facility: [facility] []   [place] []   [facility] []   [facility] [x]   Other []       CHIEF COMPLAINT:  CMM/LTC     HISTORY OF PRESENT ILLNESS:  [x]  Follow Up visit for coordination of long term care issues and chronic medical management needs.    Vascular dementia with behavioral disturbance/impaired mobility and ADLs/age-related physical debility/chronic A. fib/COPD/hypertension    PAST MEDICAL & SURGICAL HISTORY:  Past Medical History:   Diagnosis Date   • Atrial fibrillation (CMS/HCC)    • COPD (chronic obstructive pulmonary disease) (CMS/HCC)    • Hypertension       Past Surgical History:   Procedure Laterality Date   • HYSTERECTOMY          MEDICATIONS:  Medication administration record for Jaleesa Marinelli reviewed and reconciled today    ALLERGIES:  Allergies   Allergen Reactions   • Macrobid [Nitrofurantoin Macrocrystal] Unknown (See Comments)     unknown        REVIEW OF SYSTEMS:  • Appetite: Fair [x]   Good []   Poor []   Weight Loss []  [x]  Weight Stable   Unavoidable Weight Loss []  Tolerating Tube Feeding []    Supplements Provided []   • Constitutional: Negative for fever, chills, diaphoresis or fatigue and weight change.   • HENT: No dysphagia; no changes to vision/hearing/smell/taste; no epistaxis  • Eyes: Negative for redness and visual disturbance.   • Respiratory: Negative for shortness of breath, chest pain, cough or chest tightness.   • Cardiovascular: Negative for chest pain and palpitations.   • Gastrointestinal: Negative for abdominal distention, abdominal pain and blood in stool.   • Endocrine: Negative for cold  intolerance and heat intolerance.   • Genitourinary: Negative for difficulty urinating, dysuria and frequency.Negative for hematuria   • Musculoskeletal: Chronic myalgias and arthralgias  • Integumentary: No open wounds, rash or concerning skin lesions  • Neurological: Negative for syncope, weakness and headaches.   • Hematological: Negative for adenopathy. Does not bruise/bleed easily.   • Immunological: Negative for reported allergies or immunological disorders  • Psychological: Occasional increased anxiety    PHYSICAL EXAMINATION:  VITAL SIGNS: /70, HR 66 bpm, RR 18, weight 127    General Appearance:  [x]  Alert   [x]  Oriented x person  [x]  No acute distress, chronically ill-appearing female    [x]  Confused  []  Disoriented   []  Comatose   Head:  Atraumatic and normocephalic, without obvious abnormality.   Eyes:          PERRLA, conjunctivae and sclerae normal, no Icterus. No pallor. Extra-occular movements are within normal limits.   Ears:  Ears appear intact with no abnormalities noted.   Throat: No oral lesions, no thrush, oral mucosa moist.   Neck: Supple, trachea midline, no thyromegaly, no carotid bruit.   Back:   No kyphoscoliosis. No tenderness to palpation.   Lungs:   Chest shape is normal. Breath sounds heard bilaterally equally.  No wheezing.  Bilateral basal crackles heard.    Heart:  Normal S1 and S2, no murmur, no gallop, no rub. No JVD.   Abdomen:   Normal bowel sounds, no masses, no organomegaly. Soft, non-tender, non-distended, no guarding    Extremities: Moves all extremities well, edema, cyanosis or clubbing.  Frail build.    Pulses: Pulses palpable and equal bilaterally.   Skin: No bleeding or rash.  Generalized dry skin noted.  Age-related atrophy of skin.   Neurologic: [x] Normal speech []  Normal mental status    [x] Cranial nerves II through XII intact   [x]  No anosmia [x]  DTR 2+ [x]  Proprioception intact  []  No focal motor/sensory deficits     Psych/Mood:        [x]  No  acute changes []  Depressed  Urinary:        []  Continent  [x]  Incontinent  []  Retention  []  F/C     []  UTI w/treatment in progress  RECORD REVIEW:   • Consult notes []   • Admission and subsequent notes []   •  [x] I have personally reviewed and interpreted available lab data, radiology studies and ECG obtained at time of visit.  [x] Medication Review: I have reviewed the patients active and prn medications at [facility]     ASSESSMENT   Diagnoses and all orders for this visit:    Impaired mobility and ADLs    Age-related physical debility    Essential hypertension    Chronic atrial fibrillation (CMS/HCC)    Chronic obstructive bronchitis (CMS/[facility])    Vascular dementia with behavior disturbance        PLAN  Continue supportive care as needed for ADLs, as well as all mobilization/transfers issues.  She continues to have periodic outburst of increased anxiety, of chronic nature.  BP remains at goal, HR controlled, patient continues to demonstrate hemodynamic stability.  Continue current medication regimen, without need for changes at this time.  Continue surveillance labs when needed.  Patient's cognitive reserve continues an expected decline, contributes to her periodic/persistent behaviors, severe at times.  Consistent appetite, noted relative weight stability.    [x]  Discussed Patient in detail with nursing/staff, addressed all needs today.     [x]  Plan of Care Reviewed   []   PT/OT Reviewed   []  Order Changes  []   Discharge Plans Reviewed        Tony Marc  2/27/2019

## 2019-08-30 ENCOUNTER — NURSING HOME (OUTPATIENT)
Dept: FAMILY MEDICINE CLINIC | Facility: CLINIC | Age: 84
End: 2019-08-30

## 2019-08-30 DIAGNOSIS — Z74.09 IMPAIRED MOBILITY AND ADLS: Chronic | ICD-10-CM

## 2019-08-30 DIAGNOSIS — J44.9 CHRONIC OBSTRUCTIVE BRONCHITIS (HCC): Chronic | ICD-10-CM

## 2019-08-30 DIAGNOSIS — Z78.9 IMPAIRED MOBILITY AND ADLS: Chronic | ICD-10-CM

## 2019-08-30 DIAGNOSIS — I48.20 CHRONIC ATRIAL FIBRILLATION (HCC): Chronic | ICD-10-CM

## 2019-08-30 DIAGNOSIS — F01.518 VASCULAR DEMENTIA WITH BEHAVIOR DISTURBANCE (HCC): Chronic | ICD-10-CM

## 2019-08-30 DIAGNOSIS — I10 ESSENTIAL HYPERTENSION: Chronic | ICD-10-CM

## 2019-08-30 DIAGNOSIS — N89.8 VAGINAL DISCHARGE: Primary | ICD-10-CM

## 2019-08-30 PROCEDURE — 99309 SBSQ NF CARE MODERATE MDM 30: CPT | Performed by: NURSE PRACTITIONER

## 2019-09-02 NOTE — PROGRESS NOTES
"Nursing Home Follow Up Note      Tony Marc DO []   TESSA Bojorquez [x]  [address] [place] [address]  Phone: [phone]  Fax: [phone] Edvin Ugalde MD []    Tyrone Ratliff DO []   [address] [place] [address]  Phone: [phone]  Fax: [phone]     PATIENT NAME: Jaleesa Marinelli                                                                          YOB: 1929           DATE OF SERVICE: 08/30/2019  FACILITY:  [][place]   [] [place]   [] [facility]   [x] [facility]   [] Other ______________________________________________________________________    \" I personally performed the service documented in this note. The ancillary staff only assisted in transcribing the note and was not involved in any other part of rendering this service\".    CHIEF COMPLAINT:  Vaginal discharge      HISTORY OF PRESENT ILLNESS:   Per family, patient has had a white vaginal discharge, since yesterday. She denies vaginal itching or burning.    Management of chronic conditions: HTN, Afib, Dementia    PAST MEDICAL & SURGICAL HISTORY:   Past Medical History:   Diagnosis Date   • Atrial fibrillation (CMS/HCC)    • COPD (chronic obstructive pulmonary disease) (CMS/HCC)    • Hypertension       Past Surgical History:   Procedure Laterality Date   • HYSTERECTOMY           MEDICATIONS:  I have reviewed and reconciled the patients medication list in the patients chart at the skilled nursing facility today.      ALLERGIES:    Allergies   Allergen Reactions   • Macrobid [Nitrofurantoin Macrocrystal] Unknown (See Comments)     unknown         SOCIAL HISTORY:    Social History     Socioeconomic History   • Marital status:      Spouse name: Not on file   • Number of children: Not on file   • Years of education: Not on file   • Highest education level: Not on file   Tobacco Use   • Smoking status: Never Smoker   Substance and Sexual Activity   • Alcohol use: No   • Drug use: No       FAMILY " HISTORY:    No family history on file.    REVIEW OF SYSTEMS:    Review of Systems   Constitutional: Negative for activity change, appetite change, chills, diaphoresis, fatigue, fever, unexpected weight gain and unexpected weight loss.   HENT: Negative for congestion, ear pain, mouth sores, nosebleeds, postnasal drip, rhinorrhea, sinus pressure, sneezing, sore throat, swollen glands and trouble swallowing.    Eyes: Negative for blurred vision, pain, discharge, redness, itching and visual disturbance.   Respiratory: Negative for apnea, cough, choking, chest tightness, shortness of breath, wheezing and stridor.    Cardiovascular: Negative for chest pain, palpitations and leg swelling.   Gastrointestinal: Negative for abdominal distention, abdominal pain, blood in stool, constipation, diarrhea, nausea, vomiting, GERD and indigestion.   Endocrine: Negative for polydipsia and polyuria.   Genitourinary: Positive for vaginal discharge. Negative for decreased urine volume, difficulty urinating, dysuria, flank pain, frequency, hematuria, urgency and urinary incontinence.   Musculoskeletal: Positive for arthralgias. Negative for back pain, gait problem, joint swelling and myalgias.   Skin: Negative for color change, dry skin and skin lesions.   Allergic/Immunologic: Negative for environmental allergies.   Neurological: Positive for memory problem and confusion. Negative for dizziness, seizures, speech difficulty and weakness.   Psychiatric/Behavioral: Negative for behavioral problems, dysphoric mood, hallucinations, sleep disturbance and depressed mood. The patient is not nervous/anxious.      Exam re-performed and the findings are noted.    PHYSICAL EXAMINATION:   VITAL SIGNS: BP: 120/72,  HR: 70    RR:18,    02: 94% ,    T: 97.8,    Wt: 121    Physical Exam   Constitutional: She appears well-developed and well-nourished.   HENT:   Head: Normocephalic.   Right Ear: External ear normal.   Left Ear: External ear normal.   Nose:  Nose normal.   Eyes: Conjunctivae are normal.   Neck: Normal range of motion.   Cardiovascular: Normal rate, regular rhythm and intact distal pulses.   Pulmonary/Chest: Effort normal and breath sounds normal. No respiratory distress. She has no wheezes. She has no rales.   Abdominal: Soft. Bowel sounds are normal. She exhibits no distension and no mass. There is no tenderness. No hernia.   Genitourinary:   Genitourinary Comments: Vagina discharge per family   Musculoskeletal:   NROM major joints   Neurological: She is alert.   Skin: Skin is warm and dry. No rash noted.   Psychiatric: She has a normal mood and affect. Her behavior is normal.   Nursing note and vitals reviewed.    Exam re-performed and the findings are noted.    RECORDS REVIEW:   I have reviewed and interpreted the following labs at today's visit: no new labs since previous visit    ASSESSMENT     Diagnoses and all orders for this visit:    Vaginal discharge    Essential hypertension    Chronic atrial fibrillation (CMS/HCC)    Chronic obstructive bronchitis (CMS/HCC)    Vascular dementia with behavior disturbance    Impaired mobility and ADLs        PLAN    Culture vaginal discharge and give Diflucan 150 mg x 1.    HTN at goal on current medications.     Afib controlled with current medications.     COPD stable with no s/s acute exacerbation.     Dementia symptoms stable with no acute behavior changes. Appetite and weight stable.    Staff to continue supportive care for all ADLs.     Patient, daughter and staff,  encouraged to keep me informed of any acute changes, lack of improvement, or any new concerning symptoms.       [x]  Discussed Patient in detail with nursing/staff, addressed all needs today.     [x]  Plan of Care Reviewed   []  PT/OT Reviewed   [x]  Order Changes  []  Discharge Plans Reviewed  [x]  Advance Directive on file with Nursing Home.   [x]  POA on file with Nursing Home.   [x]  Code Status listed: []  Full Code   [x]  DNR           Total face to face time spent with patient 10 minutes.  Of which  7 where in counseling the patient and family.

## 2019-09-11 NOTE — PROGRESS NOTES
Nursing Home Progress Note      Patient Name: Jaleesa Marinelli   YOB: 1929    Date of Service: 9/11/2019    Tony Marc,  [x]  Dominique Oliveira, TESSA ?  [address] [place] [address]  Phone: [phone]  Fax: [phone] Edvin Ugalde MD ?  Tyrone Ratliff, DO ?  [address] [place] [address]  Phone: [phone]  Fax: [phone]       Facility: [place] []   [place] []   [facility] []   [facility] [x]   Other []       CHIEF COMPLAINT:  CMM/LTC     HISTORY OF PRESENT ILLNESS:  [x]  Follow Up visit for coordination of long term care issues and chronic medical management needs.    Vascular dementia with behavioral disturbance/impaired mobility and ADLs/age-related physical debility/chronic A. fib/COPD/hypertension    PAST MEDICAL & SURGICAL HISTORY:  Past Medical History:   Diagnosis Date   • Atrial fibrillation (CMS/HCC)    • COPD (chronic obstructive pulmonary disease) (CMS/HCC)    • Hypertension       Past Surgical History:   Procedure Laterality Date   • HYSTERECTOMY          MEDICATIONS:  Medication administration record for Jaleesa Marinelli reviewed and reconciled today    ALLERGIES:  Allergies   Allergen Reactions   • Macrobid [Nitrofurantoin Macrocrystal] Unknown (See Comments)     unknown        REVIEW OF SYSTEMS:  • Appetite: Fair []   Good []   Poor [x]   Weight Loss []  [x]  Weight Stable   Unavoidable Weight Loss []  Tolerating Tube Feeding []    Supplements Provided []   • Constitutional: Negative for fever, chills, diaphoresis or fatigue and weight change.   • HENT: No dysphagia; no changes to vision/hearing/smell/taste; no epistaxis  • Eyes: Negative for redness and visual disturbance.   • Respiratory: Negative for shortness of breath, chest pain, cough or chest tightness.   • Cardiovascular: Negative for chest pain and palpitations.   • Gastrointestinal: Negative for abdominal distention, abdominal pain and blood in stool.   • Endocrine: Negative for cold  intolerance and heat intolerance.   • Genitourinary: Negative for difficulty urinating, dysuria and frequency.Negative for hematuria   • Musculoskeletal: Chronic myalgias and arthralgias  • Integumentary: No open wounds, rash or concerning skin lesions  • Neurological: Negative for syncope and headaches.  Generalized weakness  • Hematological: Negative for adenopathy. Does not bruise/bleed easily.   • Immunological: Negative for reported allergies or immunological disorders  • Psychological: Occasional increased anxiety    PHYSICAL EXAMINATION:  VITAL SIGNS: B /70, HR 70 bpm, RR 20, weight 119    General Appearance:  [x]  Alert   [x]  Oriented x person  [x]  No acute distress, chronically ill-appearing female    [x]  Confused  []  Disoriented   []  Comatose   Head:  Atraumatic and normocephalic, without obvious abnormality.   Eyes:          PERRLA, conjunctivae and sclerae normal, no Icterus. No pallor. Extra-occular movements are within normal limits.   Ears:  Ears appear intact with no abnormalities noted.   Throat: No oral lesions, no thrush, oral mucosa moist.   Neck: Supple, trachea midline, no thyromegaly, no carotid bruit.   Back:   No kyphoscoliosis. No tenderness to palpation.   Lungs:   Chest shape is normal. Breath sounds heard bilaterally equally.  No wheezing.  Bilateral basal crackles heard.    Heart:  Normal S1 and S2, no murmur, no gallop, no rub. No JVD.   Abdomen:   Normal bowel sounds, no masses, no organomegaly. Soft, non-tender, non-distended, no guarding    Extremities: Moves all extremities well, edema, cyanosis or clubbing.  Frail build.    Pulses: Pulses palpable and equal bilaterally.   Skin: No bleeding or rash.  Generalized dry skin noted.  Age-related atrophy of skin.   Neurologic: [x] Normal speech []  Normal mental status    [x] Cranial nerves II through XII intact   [x]  No anosmia [x]  DTR 2+ [x]  Proprioception intact  []  No focal motor/sensory deficits     Psych/Mood:         [x]  No acute changes []  Depressed  Urinary:        []  Continent  [x]  Incontinent  []  Retention  []  F/C     []  UTI w/treatment in progress  RECORD REVIEW:   • Consult notes []   • Admission and subsequent notes []   •  [x] I have personally reviewed and interpreted available lab data, radiology studies and ECG obtained at time of visit.  [x] Medication Review: I have reviewed the patients active and prn medications at Skilled Nursing Facility     ASSESSMENT   Diagnoses and all orders for this visit:    Impaired mobility and ADLs    Age-related physical debility    Chronic obstructive bronchitis (CMS/HCC)    Chronic atrial fibrillation (CMS/HCC)    Essential hypertension    Vascular dementia with behavior disturbance        PLAN  Continuation of supportive care for ADLs assistance, as well as all mobilization/transfers needs.  Chronic periodic outburst of increased anxiety, of consistent nature.  BP is at goal, HR controlled, patient continues to demonstrate hemodynamic stability.  Changes to her PRN anxiolytics at this time.  Continue surveillance labs when appropriate.  Patient's cognitive reserve continues an expected decline, this contributes to her periodic/persistent behaviors.  Consistent appetite without change, noted relative weight stability.    [x]  Discussed Patient in detail with nursing/staff, addressed all needs today.     [x]  Plan of Care Reviewed   []   PT/OT Reviewed   []  Order Changes  []   Discharge Plans Reviewed        Tony Marc  9/11/2019

## 2019-09-21 NOTE — PROGRESS NOTES
Nursing Home Follow Up Note      Tony Marc DO  []  TESSA Bojorquez  []  TESSA Chacko [x]  [address] [facility], [place] [address]  Phone: [phone]  Fax: [phone] Edvin Ugalde MD  []  Tyrone Ratliff DO  []  [address] [place] [address]  Phone: [phone]  Fax: [phone]     PATIENT NAME: Jaleesa Marinelli                                                                          YOB: 1929           DATE OF SERVICE: 09/17/2019  FACILITY:   [] [place]    [] [place]    [] [facility]     [x] [facility]    [] Other ______________________________________________________________________     CHIEF COMPLAINT:  dysuria      HISTORY OF PRESENT ILLNESS:   Family reports to nursing staff that patient is complaining of burning with urination. They want a UA done    REVIEW OF SYSTEMS:  Unable to obtain due to mentation    PAST MEDICAL & SURGICAL HISTORY:   Past Medical History:   Diagnosis Date   • Atrial fibrillation (CMS/HCC)    • COPD (chronic obstructive pulmonary disease) (CMS/HCC)    • Hypertension       Past Surgical History:   Procedure Laterality Date   • HYSTERECTOMY           MEDICATIONS:  I have reviewed and reconciled the patients medication list in the patients chart at the [facility] nursing [place] today.      ALLERGIES:  I have reviewed allergies on file at the [facility]  Allergies   Allergen Reactions   • Macrobid [Nitrofurantoin Macrocrystal] Unknown (See Comments)     unknown         SOCIAL HISTORY:  Social History     Socioeconomic History   • Marital status:      Spouse name: Not on file   • Number of children: Not on file   • Years of education: Not on file   • Highest education level: Not on file   Tobacco Use   • Smoking status: Never Smoker   Substance and Sexual Activity   • Alcohol use: No   • Drug use: No       PHYSICAL EXAMINATION:   VITAL SIGNS:   BP: 120/70, HR: 70, Resp: 20, Temp: 97.8, Weight: 119    Constitutional: No  acute distress, resting in bed  HENT: NCAT, mucous membranes moist  Respiratory: Clear to auscultation bilaterally, respiratory effort normal   Cardiovascular: RRR, no murmurs, rubs, or gallops  Gastrointestinal: Positive bowel sounds, soft, nontender, nondistended  : smells of foul urine  Musculoskeletal: No bilateral ankle edema, PPP  Psychiatric: quiet, cooperative  Neurologic: slow to respond to question, confused  Skin: No rashes    RECORDS REVIEW:   I have reviewed labs available at time of visit.  The following are pertinent: creatinine 1.3    ASSESSMENT     Diagnoses and all orders for this visit:    Dysuria        PLAN  -- obtain UA with culture  -- has hx of klebsiella UTI  -- start doxy 100mg BID for presumed UTI, follow culture    [x]  Discussed Patient in detail with nursing/staff, addressed all needs today.     [x]  Plan of Care Reviewed   []  PT/OT Reviewed   [x]  Order Changes  []  Discharge Plans Reviewed  [x]  Advance Directive on file with Nursing Home.   [x]  POA on file with Nursing Home.   [x]  Code Status: I have reviewed the CODE STATUS on file at the skilled nursing facility       TESSA Chacko.

## 2019-09-25 NOTE — PROGRESS NOTES
Nursing Home Follow Up Note      Tony Marc DO  []  TESSA Bojorquez  []  TESSA Chacko [x]  [address] [facility], [place] [address]  Phone: [phone]  Fax: [phone] Edvin Ugalde MD  []  Tyrone Ratliff DO  []  [address] [place] [address]  Phone: [phone]  Fax: [phone]     PATIENT NAME: Jaleesa Marinelli                                                                          YOB: 1929           DATE OF SERVICE: 09/25/2019  FACILITY:   [] [place]    [] [place]    [] [facility]     [x] [facility]    [] Other ______________________________________________________________________     CHIEF COMPLAINT:  F/u UTI, HTN, COPD, dementia      HISTORY OF PRESENT ILLNESS:   Patient doing better per nursing since completed anbx  No other events  Patient denies pain and soa    REVIEW OF SYSTEMS:  Denies soa, generalized pain, or abdomdinal pain. Full ROS unable to be obtained due to mentation    PAST MEDICAL & SURGICAL HISTORY:   Past Medical History:   Diagnosis Date   • Atrial fibrillation (CMS/HCC)    • COPD (chronic obstructive pulmonary disease) (CMS/HCC)    • Hypertension       Past Surgical History:   Procedure Laterality Date   • HYSTERECTOMY           MEDICATIONS:  I have reviewed and reconciled the patients medication list in the patients chart at the [facility] nursing [facility] today.      ALLERGIES:  I have reviewed allergies on file at the [facility]  Allergies   Allergen Reactions   • Macrobid [Nitrofurantoin Macrocrystal] Unknown (See Comments)     unknown         SOCIAL HISTORY:  Social History     Socioeconomic History   • Marital status:      Spouse name: Not on file   • Number of children: Not on file   • Years of education: Not on file   • Highest education level: Not on file   Tobacco Use   • Smoking status: Never Smoker   Substance and Sexual Activity   • Alcohol use: No   • Drug use: No       PHYSICAL EXAMINATION:   VITAL SIGNS:    BP: 116.95, HR: 82, Resp: 16, Temp: 97.8, Weight: 119    Constitutional: No acute distress, awake, alert, sitting up by nurses station  HENT: NCAT, mucous membranes moist  Respiratory: Clear to auscultation bilaterally, respiratory effort normal, on RA  Cardiovascular: RRR, no murmurs, rubs, or gallops  Gastrointestinal: Positive bowel sounds, soft, nontender, nondistended  Musculoskeletal: No bilateral ankle edema, PPP  Psychiatric: Appropriate affect, cooperative  Neurologic: alert and interactive, speech clear, oriented to self  Skin: No rashes    RECORDS REVIEW:   I have reviewed labs available at time of visit.     ASSESSMENT     Diagnoses and all orders for this visit:    Essential hypertension    Chronic atrial fibrillation (CMS/HCC)    Chronic obstructive bronchitis (CMS/HCC)    Vascular dementia with behavior disturbance    Age-related physical debility    Impaired mobility and ADLs        PLAN    HTN  AF  - continue current BP meds as BP is stable  - not on AC, continue ASA    COPD  - stable, continue nebs    Hypothyroidism  - stable, continue replacement with TSH checks per protocol    Dementia  Mood Disorder  - stable without behavior disturbance, continue buspar, zoloft, depakote    Recent UTI  - resolved with anbx     [x]  Discussed Patient in detail with nursing/staff, addressed all needs today.     [x]  Plan of Care Reviewed   []  PT/OT Reviewed   []  Order Changes  []  Discharge Plans Reviewed  [x]  Advance Directive on file with Nursing Home.   [x]  POA on file with Nursing Home.   [x]  Code Status: I have reviewed the CODE STATUS on file at the skilled nursing facility       TESSA Chacko.

## 2019-10-10 NOTE — PROGRESS NOTES
Nursing Home Progress Note      Patient Name: Jaleesa Marinelli   YOB: 1929    Date of Service: 10/2/2019    Tony Marc,  [x]  Dominique Oliveira, APRN ?  [address] [place] [address]  Phone: [phone]  Fax: [phone] Edvin Ugalde MD ?  Tyrone Ratliff, DO ?  [address] [place] [address]  Phone: [phone]  Fax: [phone]       Facility: [facility] []   [place] []   [facility] []   [facility] [x]   Other []       CHIEF COMPLAINT:  CMM/LTC     HISTORY OF PRESENT ILLNESS:  [x]  Follow Up visit for coordination of long term care issues and chronic medical management needs.    Vascular dementia with behavioral disturbance/impaired mobility and ADLs/age-related physical debility/chronic A. fib/COPD/hypertension    PAST MEDICAL & SURGICAL HISTORY:  Past Medical History:   Diagnosis Date   • Atrial fibrillation (CMS/HCC)    • COPD (chronic obstructive pulmonary disease) (CMS/HCC)    • Hypertension       Past Surgical History:   Procedure Laterality Date   • HYSTERECTOMY          MEDICATIONS:  Medication administration record for Jaleesa Marinelli reviewed and reconciled today    ALLERGIES:  Allergies   Allergen Reactions   • Macrobid [Nitrofurantoin Macrocrystal] Unknown (See Comments)     unknown        REVIEW OF SYSTEMS:  • Appetite: Fair []   Good []   Poor [x]   Weight Loss []  [x]  Weight Stable   Unavoidable Weight Loss []  Tolerating Tube Feeding []    Supplements Provided []   Dementia, unable to accurately reflect her review of systems.  I have relied on direct consultation with patient's staff involved in her care as well as her direct nurses.    PHYSICAL EXAMINATION:  VITAL SIGNS: /72, HR 76 bpm, RR 18, weight 122    General Appearance:  [x]  Alert   [x]  Oriented x person  [x]  No acute distress, chronically ill-appearing female    [x]  Confused  []  Disoriented   []  Comatose   Head:  Atraumatic and normocephalic, without obvious abnormality.   Eyes:           PERRLA, conjunctivae and sclerae normal, no Icterus. No pallor. Extra-occular movements are within normal limits.   Ears:  Ears appear intact with no abnormalities noted.   Throat: No oral lesions, no thrush, oral mucosa moist.   Neck: Supple, trachea midline, no thyromegaly, no carotid bruit.   Back:   No kyphoscoliosis. No tenderness to palpation.   Lungs:   Chest shape is normal. Breath sounds heard bilaterally equally.  No wheezing.  Bilateral basal crackles heard.    Heart:  Normal S1 and S2, no murmur, no gallop, no rub. No JVD.   Abdomen:   Normal bowel sounds, no masses, no organomegaly. Soft, non-tender, non-distended, no guarding    Extremities: Moves all extremities well, edema, cyanosis or clubbing.  Frail build.    Pulses: Pulses palpable and equal bilaterally.   Skin: No bleeding or rash.  Generalized dry skin noted.  Age-related atrophy of skin.   Neurologic: [x] Normal speech []  Normal mental status    [x] Cranial nerves II through XII intact   [x]  No anosmia [x]  DTR 2+ [x]  Proprioception intact  []  No focal motor/sensory deficits     Psych/Mood:        [x]  No acute changes []  Depressed  Urinary:        []  Continent  [x]  Incontinent  []  Retention  []  F/C     []  UTI w/treatment in progress  RECORD REVIEW:   • Consult notes []   • Admission and subsequent notes []   •  [x] I have personally reviewed and interpreted available lab data, radiology studies and ECG obtained at time of visit.  [x] Medication Review: I have reviewed the patients active and prn medications at Skilled Nursing Facility     ASSESSMENT   Diagnoses and all orders for this visit:    Impaired mobility and ADLs    Age-related physical debility    Essential hypertension    Chronic atrial fibrillation    Chronic obstructive bronchitis (CMS/HCC)    Vascular dementia with behavior disturbance (CMS/HCC)        PLAN  Continue supportive care for all ADLs assistance, and any mobilization/transfers needs.  She continues to  demonstrate chronic periodic outburst of increased anxiety, of early consistent nature.  BP remains at goal, HR well controlled, patient continues to demonstrate hemodynamic stability on review of vital signs.  Nursing/staff relay good response to PRN anxiolytics at this time.  Continue surveillance labs when needed.  Patient's cognitive reserve continues an expected nickel decline, this contributes to her periodic/persistent behaviors.  Consistent appetite without significant change, noted relative weight stability with gain.    [x]  Discussed Patient in detail with nursing/staff, addressed all needs today.     [x]  Plan of Care Reviewed   []   PT/OT Reviewed   []  Order Changes  []   Discharge Plans Reviewed        Tony Marc  10/2/2019

## 2019-10-25 NOTE — PROGRESS NOTES
Nursing Home Progress Note      Patient Name: Jaleesa Marinelli   YOB: 1929    Date of Service: 3/27/2019    Tony Marc,  [x]  Dominique Oliveira, APRN ?  [address] [place] [address]  Phone: [phone]  Fax: [phone] Edvin Ugalde MD ?  Tyrone Ratliff, DO ?  [address] [place] [address]  Phone: [phone]  Fax: [phone]       Facility: [place] []   [place] []   [facility] []   [facility] [x]   Other []       CHIEF COMPLAINT:  CMM/LTC     HISTORY OF PRESENT ILLNESS:  [x]  Follow Up visit for coordination of long term care issues and chronic medical management needs.    Vascular dementia with behavioral disturbance/impaired mobility and ADLs/age-related physical debility/chronic A. fib/COPD/hypertension    PAST MEDICAL & SURGICAL HISTORY:  Past Medical History:   Diagnosis Date   • Atrial fibrillation (CMS/HCC)    • COPD (chronic obstructive pulmonary disease) (CMS/HCC)    • Hypertension       Past Surgical History:   Procedure Laterality Date   • HYSTERECTOMY          MEDICATIONS:  Medication administration record for Jaleesa Marinelli reviewed and reconciled today    ALLERGIES:  Allergies   Allergen Reactions   • Macrobid [Nitrofurantoin Macrocrystal] Unknown (See Comments)     unknown        REVIEW OF SYSTEMS:  • Appetite: Fair [x]   Good []   Poor []   Weight Loss []  [x]  Weight Stable   Unavoidable Weight Loss []  Tolerating Tube Feeding []    Supplements Provided []   Patient's dementia has advanced to the point of inaccurate responses and ability to demonstrate accuracy and review of systems.  Majority is obtained through review of records and discussion with direct treating staff/nursing.    PHYSICAL EXAMINATION:  VITAL SIGNS: /58, HR 74 bpm, RR 18, weight 128    General Appearance:  [x]  Alert   [x]  Oriented x person  [x]  No acute distress, chronically ill-appearing female    [x]  Confused  []  Disoriented   []  Comatose   Head:  Atraumatic and  normocephalic, without obvious abnormality.   Eyes:          PERRLA, conjunctivae and sclerae normal, no Icterus. No pallor. Extra-occular movements are within normal limits.   Ears:  Ears appear intact with no abnormalities noted.   Throat: No oral lesions, no thrush, oral mucosa moist.   Neck: Supple, trachea midline, no thyromegaly, no carotid bruit.   Back:   No kyphoscoliosis. No tenderness to palpation.   Lungs:   Chest shape is normal. Breath sounds heard bilaterally equally.  No wheezing.  Bilateral basal crackles heard.    Heart:  Normal S1 and S2, no murmur, no gallop, no rub. No JVD.   Abdomen:   Normal bowel sounds, no masses, no organomegaly. Soft, non-tender, non-distended, no guarding    Extremities: Moves all extremities well, edema, cyanosis or clubbing.  Frail build.    Pulses: Pulses palpable and equal bilaterally.   Skin: No bleeding or rash.  Generalized dry skin noted.  Age-related atrophy of skin.   Neurologic: [x] Normal speech []  Normal mental status    [x] Cranial nerves II through XII intact   [x]  No anosmia [x]  DTR 2+ [x]  Proprioception intact  []  No focal motor/sensory deficits     Psych/Mood:        [x]  No acute changes []  Depressed  Urinary:        []  Continent  [x]  Incontinent  []  Retention  []  F/C     []  UTI w/treatment in progress  RECORD REVIEW:   • Consult notes []   • Admission and subsequent notes []   •  [x] I have personally reviewed and interpreted available lab data, radiology studies and ECG obtained at time of visit.  [x] Medication Review: I have reviewed the patients active and prn medications at Skilled Nursing Facility     ASSESSMENT   Diagnoses and all orders for this visit:    Impaired mobility and ADLs    Age-related physical debility    Essential hypertension    Chronic atrial fibrillation    Chronic obstructive bronchitis (CMS/HCC)    Vascular dementia with behavior disturbance (CMS/[facility])        PLAN  Supportive care for any ADLs assistance, as well as  all mobilization/transfers needs.  Chronic periodic outburst of increased anxiety, significant change.  BP is at goal, HR controlled, patient continues to demonstrate hemodynamic stability on review of VS.  Continue surveillance labs when needed.  Patient's cognitive reserve continues on expected decline, certainly contributes to her periodic/persistent behaviors, they are severe at times.  Consistent appetite, noted relative weight stability, 1 pound gain.    [x]  Discussed Patient in detail with nursing/staff, addressed all needs today.     [x]  Plan of Care Reviewed   []   PT/OT Reviewed   []  Order Changes  []   Discharge Plans Reviewed        Tony Marc  3/27/2019

## 2019-11-05 NOTE — PROGRESS NOTES
Nursing Home Follow Up Note      Tony Marc DO []   TESSA Bojorquez [x]  [address] [place] [address]  Phone: [phone]  Fax: [phone] Edvin Ugalde MD []    Tyrone Ratliff DO []   [address] [place] [address]  Phone: [phone]  Fax: [phone]     PATIENT NAME: Jaleesa Marinelli                                                                          YOB: 1929           DATE OF SERVICE: 11/4/2019  FACILITY:  [][facility]   [] [place]   [] [facility]   [x] [facility]   [] Other ______________________________________________________________________      “I confirm accuracy of unchanged data/findings which have been carried forward from previous visit, as well as I have updated appropriately those that have changed.”      CHIEF COMPLAINT:  Vaginal discharge      HISTORY OF PRESENT ILLNESS:   Per family, patient has had cough and congestion since yesterday. They report that she gets this every fall and needs nebulizers and and cough medication.    Management of chronic conditions: HTN, Afib, Dementia    PAST MEDICAL & SURGICAL HISTORY:   Past Medical History:   Diagnosis Date   • Atrial fibrillation (CMS/HCC)    • COPD (chronic obstructive pulmonary disease) (CMS/HCC)    • Hypertension       Past Surgical History:   Procedure Laterality Date   • HYSTERECTOMY           MEDICATIONS:  I have reviewed and reconciled the patients medication list in the patients chart at the skilled nursing facility today.      ALLERGIES:    Allergies   Allergen Reactions   • Macrobid [Nitrofurantoin Macrocrystal] Unknown (See Comments)     unknown         SOCIAL HISTORY:    Social History     Socioeconomic History   • Marital status:      Spouse name: Not on file   • Number of children: Not on file   • Years of education: Not on file   • Highest education level: Not on file   Tobacco Use   • Smoking status: Never Smoker   Substance and Sexual Activity   • Alcohol use: No   • Drug  use: No       FAMILY HISTORY:    No family history on file.    REVIEW OF SYSTEMS:    Review of Systems   Constitutional: Negative for activity change, appetite change, chills, diaphoresis, fatigue, fever, unexpected weight gain and unexpected weight loss.   HENT: Positive for congestion. Negative for ear pain, mouth sores, nosebleeds, postnasal drip, rhinorrhea, sinus pressure, sneezing, sore throat, swollen glands and trouble swallowing.    Eyes: Negative for blurred vision, pain, discharge, redness, itching and visual disturbance.   Respiratory: Positive for cough. Negative for apnea, choking, chest tightness, shortness of breath, wheezing and stridor.    Cardiovascular: Negative for chest pain, palpitations and leg swelling.   Gastrointestinal: Negative for abdominal distention, abdominal pain, blood in stool, constipation, diarrhea, nausea, vomiting, GERD and indigestion.   Endocrine: Negative for polydipsia and polyuria.   Genitourinary: Negative for decreased urine volume, difficulty urinating, dysuria, flank pain, frequency, hematuria, pelvic pain, urgency, urinary incontinence and vaginal discharge.   Musculoskeletal: Positive for arthralgias. Negative for back pain, gait problem, joint swelling and myalgias.   Skin: Negative for color change, dry skin and skin lesions.   Allergic/Immunologic: Negative for environmental allergies.   Neurological: Positive for memory problem and confusion. Negative for dizziness, seizures, speech difficulty and weakness.   Psychiatric/Behavioral: Negative for behavioral problems, dysphoric mood, hallucinations, sleep disturbance and depressed mood. The patient is not nervous/anxious.      Exam re-performed and the findings are noted.    PHYSICAL EXAMINATION:   VITAL SIGNS: BP: 120/72,  HR: 70    RR:18,    02: 94% ,    T: 97.8,    Wt: 121    Physical Exam   Constitutional: She appears well-developed and well-nourished.   HENT:   Head: Normocephalic.   Right Ear: External ear  normal.   Left Ear: External ear normal.   Nose: Nose normal.   Eyes: Conjunctivae are normal.   Neck: Normal range of motion.   Cardiovascular: Normal rate, regular rhythm and intact distal pulses.   Pulmonary/Chest: Effort normal and breath sounds normal. No respiratory distress. She has no wheezes. She has no rales.   Abdominal: Soft. Bowel sounds are normal. She exhibits no distension and no mass. There is no tenderness. No hernia.   Genitourinary:   Genitourinary Comments: Vagina discharge per family   Musculoskeletal:   NROM major joints   Neurological: She is alert.   Skin: Skin is warm and dry. No rash noted.   Psychiatric: Her mood appears anxious. Cognition and memory are impaired.   Nursing note and vitals reviewed.    Exam re-performed and the findings are noted.    RECORDS REVIEW:   I have reviewed and interpreted the following labs at today's visit: no new labs since previous visit    ASSESSMENT     Diagnoses and all orders for this visit:    Cough    Upper respiratory tract infection, unspecified type    Vascular dementia with behavior disturbance (CMS/HCC)    Impaired mobility and ADLs        PLAN    Start Duo Nebs and Robitussin 10 ml, q 6 hours x 5 days.     HTN at goal on current medications.     Afib controlled with current medications.     COPD stable with no s/s acute exacerbation.     Dementia symptoms stable with no acute behavior changes. Appetite and weight stable.    Staff to continue supportive care for all ADLs.     Patient, daughter and staff,  encouraged to keep me informed of any acute changes, lack of improvement, or any new concerning symptoms.       [x]  Discussed Patient in detail with nursing/staff, addressed all needs today.     [x]  Plan of Care Reviewed   []  PT/OT Reviewed   [x]  Order Changes  []  Discharge Plans Reviewed  [x]  Advance Directive on file with Nursing Home.   [x]  POA on file with Nursing Home.   [x]  Code Status listed: []  Full Code   [x]  DNR

## 2019-12-09 NOTE — PROGRESS NOTES
Nursing Home Follow Up Note      Tony Marc DO []   TESSA Bojorquez [x]  [address] [place] [address]  Phone: [phone]  Fax: [phone] Edvin Ugalde MD []    Tyrone Ratliff DO []   [address] [place] [address]  Phone: [phone]  Fax: [phone]     PATIENT NAME: Jaleesa Marinelli                                                                          YOB: 1929           DATE OF SERVICE: 12/06/2019  FACILITY:  [][place]   [] [place]   [] [facility]   [x] [facility]   [] Other ______________________________________________________________________      CHIEF COMPLAINT:  CMM and LTC    HISTORY OF PRESENT ILLNESS:       Coordination of LTC needs and chronic conditions as listed below:    PAST MEDICAL & SURGICAL HISTORY:   Past Medical History:   Diagnosis Date   • Atrial fibrillation (CMS/HCC)    • COPD (chronic obstructive pulmonary disease) (CMS/HCC)    • Hypertension       Past Surgical History:   Procedure Laterality Date   • HYSTERECTOMY           MEDICATIONS:  I have reviewed and reconciled the patients medication list in the patients chart at the skilled nursing facility today.      ALLERGIES:    Allergies   Allergen Reactions   • Macrobid [Nitrofurantoin Macrocrystal] Unknown (See Comments)     unknown         SOCIAL HISTORY:    Social History     Socioeconomic History   • Marital status:      Spouse name: Not on file   • Number of children: Not on file   • Years of education: Not on file   • Highest education level: Not on file   Tobacco Use   • Smoking status: Never Smoker   Substance and Sexual Activity   • Alcohol use: No   • Drug use: No       FAMILY HISTORY:    No family history on file.    REVIEW OF SYSTEMS:    Review of Systems   Constitutional: Negative for activity change, appetite change, chills, diaphoresis, fatigue, fever, unexpected weight gain and unexpected weight loss.   HENT: Negative for congestion, ear pain, mouth sores,  nosebleeds, postnasal drip, rhinorrhea, sinus pressure, sneezing, sore throat, swollen glands and trouble swallowing.    Eyes: Negative for blurred vision, pain, discharge, redness, itching and visual disturbance.   Respiratory: Negative for apnea, cough, choking, chest tightness, shortness of breath, wheezing and stridor.    Cardiovascular: Negative for chest pain, palpitations and leg swelling.   Gastrointestinal: Negative for abdominal distention, abdominal pain, blood in stool, constipation, diarrhea, nausea, vomiting, GERD and indigestion.   Endocrine: Negative for polydipsia and polyuria.   Genitourinary: Negative for decreased urine volume, difficulty urinating, dysuria, flank pain, frequency, hematuria, urgency and urinary incontinence.   Musculoskeletal: Positive for arthralgias and gait problem. Negative for back pain, joint swelling and myalgias.   Skin: Negative for color change, dry skin, rash and skin lesions.   Allergic/Immunologic: Negative for environmental allergies.   Neurological: Positive for weakness, memory problem and confusion. Negative for dizziness, seizures and speech difficulty.   Psychiatric/Behavioral: Positive for behavioral problems. Negative for dysphoric mood, hallucinations, sleep disturbance and depressed mood. The patient is not nervous/anxious.          PHYSICAL EXAMINATION:   VITAL SIGNS:   Vitals:    12/06/19 1601   BP: 144/80   Pulse: 82   Resp: 18   Temp: 97 °F (36.1 °C)   SpO2: 94%   Weight: 53.1 kg (117 lb)       Physical Exam   Constitutional: She appears well-developed and well-nourished.   HENT:   Head: Normocephalic.   Right Ear: External ear normal.   Left Ear: External ear normal.   Nose: Nose normal.   Eyes: Conjunctivae are normal.   Neck: Normal range of motion.   Cardiovascular: Normal rate, regular rhythm and intact distal pulses.   Pulmonary/Chest: Effort normal and breath sounds normal. No respiratory distress. She has no wheezes. She has no rales.    Abdominal: Soft. Bowel sounds are normal. She exhibits no distension and no mass. There is no tenderness. No hernia.   Musculoskeletal: She exhibits no edema or tenderness.   Neurological: She is alert.   Skin: Skin is warm and dry. No rash noted.   Psychiatric: She has a normal mood and affect. She is agitated (yells out often if family not with her). Cognition and memory are impaired.   Nursing note and vitals reviewed.      RECORDS REVIEW:   I have reviewed and interpreted the following labs at today's visit: 10/16/18: GFR 47, Creat. 1.1    ASSESSMENT     Diagnoses and all orders for this visit:    Essential hypertension    Chronic atrial fibrillation    Chronic obstructive bronchitis (CMS/HCC)    Vascular dementia with behavior disturbance (CMS/HCC)    Impaired mobility and ADLs        PLAN  BP at goal on current medications.     Afib stable with regular rate and rhythm.    Dementia stable with no acute behavior changes. Appetite and weight stable.     Staff to continue supportive care for all ADLs.     Patient, daughter and staff,  encouraged to keep me informed of any acute changes, lack of improvement, or any new concerning symptoms.       [x]  Discussed Patient in detail with nursing/staff, addressed all needs today.     [x]  Plan of Care Reviewed   []  PT/OT Reviewed   [x]  Order Changes  []  Discharge Plans Reviewed  [x]  Advance Directive on file with Nursing Home.   [x]  POA on file with Nursing Home.   [x]  Code Status listed: []  Full Code   [x]  DNR     “I confirm accuracy of unchanged data/findings which have been carried forward from previous visit, as well as I have updated appropriately those that have changed.”        Dominique Oliveira, TESSA.

## 2019-12-11 NOTE — PROGRESS NOTES
Nursing Home Progress Note        Tony Marc DO [x]  TESSA Bojorquez []  [address] [place] [address]  Phone: [phone]  Fax: [phone] Edvin Ugalde MD []  Tyrone Ratliff DO []  [address] [place] [address]  Phone: [phone]  Fax: [phone]     PATIENT NAME: Jaleesa Marinelli                                                                          YOB: 1929           DATE OF SERVICE: 10/30/2019  FACILITY: []  [place]  [] [place]  []  [facility]  [x] [facility]  []  Other ______________________________________________________________________     CHIEF COMPLAINT:  Chronic medical management and long-term care      HISTORY OF PRESENT ILLNESS:   [x]  Follow Up visit for coordination of long term care issues and chronic medical management of Diagnoses and all orders for this visit:    Impaired mobility and ADLs    Age-related physical debility    Vascular dementia with behavior disturbance (CMS/HCC)    Chronic obstructive bronchitis (CMS/HCC)    Essential hypertension    Chronic atrial fibrillation          PAST MEDICAL & SURGICAL HISTORY:   Past Medical History:   Diagnosis Date   • Atrial fibrillation (CMS/HCC)    • COPD (chronic obstructive pulmonary disease) (CMS/HCC)    • Hypertension       Past Surgical History:   Procedure Laterality Date   • HYSTERECTOMY           MEDICATIONS:  I have reviewed and reconciled the patients medication list in the patients chart at the skilled nursing facility today.      ALLERGIES:    Allergies   Allergen Reactions   • Macrobid [Nitrofurantoin Macrocrystal] Unknown (See Comments)     unknown         SOCIAL HISTORY:    Social History     Socioeconomic History   • Marital status:      Spouse name: Not on file   • Number of children: Not on file   • Years of education: Not on file   • Highest education level: Not on file   Tobacco Use   • Smoking status: Never Smoker   Substance and Sexual Activity   • Alcohol use: No    • Drug use: No       FAMILY HISTORY:    No family history on file.    REVIEW OF SYSTEMS:    Review of Systems  · Appetite: Fair [x]   Good []   Poor []   Weight Loss []  [x]  Weight Stable, 122   Unavoidable Weight Loss []  Tolerating Tube Feeding []    Supplements Provided []     Due to advanced dementia, patient is a very poor historian.  Unable to dissipate in review of systems.  I have discussed the case in detail with patient's treating nurse, as well as any support staff.  Of also reviewed records.      PHYSICAL EXAMINATION:   VITAL SIGNS:   Vitals:    10/30/19 0945   BP: 112/68   Pulse: 70   Resp: 18       Physical Exam    General Appearance:  [x]  Alert   [x]  Oriented x person  [x]  No acute distress     [x]  Confused  [x]  Disoriented, at times  []  Comatose   Head:  Atraumatic and normocephalic, without obvious abnormality.   Eyes:         PERRLA, conjunctivae and sclerae normal, no Icterus. No pallor. Extra-occular movements are within normal limits.   Ears:  Ears appear intact with no abnormalities noted.   Throat: No oral lesions, no thrush, oral mucosa moist.   Neck: Supple, trachea midline, no thyromegaly, no carotid bruit.   Back:   No kyphoscoliosis. No tenderness to palpation.   Lungs:   Chest shape is normal. Breath sounds heard bilaterally equally.  No wheezing.  Bilateral basal crackles heard.    Heart:  Normal S1 and S2, no murmur, no gallop, no rub. No JVD.   Abdomen:   Normal bowel sounds, no masses, no organomegaly. Soft, non-tender, non-distended, no guarding    Extremities: Moves all extremities at times, without edema, cyanosis or clubbing.  Frail build.   Poor core strength and stability, unable to independently ambulate or transfer.   Pulses: Pulses palpable and equal bilaterally.   Skin: No bleeding or rash.  Generalized dry skin noted.  Age-related atrophy of skin.   Neurologic: [x] Normal speech []  Normal mental status    [x] Cranial nerves II through XII intact   [x]  No anosmia  [x]  DTR 1+ []  Proprioception intact  [x]  Chronic generalized weakness of advanced nature due to age-related physical debility and advanced dementia      Psych/Mood:                    [x]  No acute changes []  Depressed  Urinary:                            []  Continent  [x]  Incontinent []  Retention  []  F/C      []  UTI w/treatment in progress         ASSESSMENT     Diagnoses and all orders for this visit:    1. Impaired mobility and ADLs (Primary)    2. Age-related physical debility    3. Vascular dementia with behavior disturbance (CMS/[facility])    4. Chronic obstructive bronchitis (CMS/HCC)    5. Essential hypertension    6. Chronic atrial fibrillation          PLAN  Heart rate and blood pressure are well controlled, at goal.  Vital signs demonstrate hemodynamic stability.    Continue supportive care as needed for any mobilization and transfers, as well as all ADL assistance.    Does not demonstrate any acute exacerbation of her chronic obstructive bronchitis.  Continue to follow clinically.    Behavior has not changed, continues to have periodic outburst including crying and yelling episodes.  She does remain relatively able to be redirected with cognitive behavioral therapies.    Surveillance labs when needed.    [x]  Discussed Patient in detail with nursing/staff, addressed all needs today.     [x]  Plan of Care Reviewed   []  PT/OT Reviewed   []  Order Changes  []  Discharge Plans Reviewed   []  Code Status Changes         Tony Marc DO  10/30/2019

## 2019-12-12 NOTE — PROGRESS NOTES
Nursing Home Follow Up Note      Tony Marc DO  []  TESSA Bojorquez  []  TESSA Chacko [x]  [address], [place] [address]  Phone: [phone]  Fax: [phone] Edvin Ugalde MD  []  Tyrone Ratliff DO  []  [address] [place] [address]  Phone: [phone]  Fax: [phone]     PATIENT NAME: Jaleesa Marinelli                                                                          YOB: 1929           DATE OF SERVICE: 12/12/2019  FACILITY:   [] [place]    [] [place]    [] [facility]     [x] [facility]    [] Other ______________________________________________________________________     CHIEF COMPLAINT:  Right hand edema      HISTORY OF PRESENT ILLNESS:   Family concerned about right hand edema.  Nursing staff notes this happens occasionally.  Patient denies any hand pain or other pain for that matter.    REVIEW OF SYSTEMS:  Gen- No fevers, chills  CV- No chest pain, palpitations  Resp- No cough, dyspnea  GI- No N/V/D, abd pain    Otherwise ROS is negative except as mentioned in the HPI.    PAST MEDICAL & SURGICAL HISTORY:   Past Medical History:   Diagnosis Date   • Atrial fibrillation (CMS/HCC)    • COPD (chronic obstructive pulmonary disease) (CMS/[facility])    • Hypertension       Past Surgical History:   Procedure Laterality Date   • HYSTERECTOMY           MEDICATIONS:  I have reviewed and reconciled the patients medication list in the patients chart at the [facility] today.      ALLERGIES:  I have reviewed allergies on file at the [facility]  Allergies   Allergen Reactions   • Macrobid [Nitrofurantoin Macrocrystal] Unknown (See Comments)     unknown         SOCIAL HISTORY:  Social History     Socioeconomic History   • Marital status:      Spouse name: Not on file   • Number of children: Not on file   • Years of education: Not on file   • Highest education level: Not on file   Tobacco Use   • Smoking status: Never Smoker    Substance and Sexual Activity   • Alcohol use: No   • Drug use: No       PHYSICAL EXAMINATION:   Vitals:    12/12/19 1330   BP: 136/80   Pulse: 80   Resp: 18   Temp: 97.9 °F (36.6 °C)   Weight: 53.1 kg (117 lb)     Constitutional: No acute distress, awake, alert  HENT: NCAT, mucous membranes moist  Respiratory: Clear to auscultation bilaterally, respiratory effort normal   Cardiovascular: RRR, no murmurs, rubs, or gallops  Gastrointestinal: Positive bowel sounds, soft, nontender, nondistended  Musculoskeletal: mild generalized edema of right hand. No edema elsewhere. No redness. Good . No bilateral ankle edema, PPP  Psychiatric: patient heard frequently yelling out but is very calm and cooperative during my exam  Neurologic: alert and interactive, speech clear  Skin: No rashes    RECORDS REVIEW:   I have reviewed labs available at time of visit.    ASSESSMENT     Diagnoses and all orders for this visit:    Edema of hand    Arthritis        PLAN  -- hand edema likely due to arthritis  -- exam is reassuring, continue tylenol prn and scheduled pain medication  -- consider steroids if worsens but for now symptoms are very minimal    [x]  Discussed Patient in detail with nursing/staff, addressed all needs today.     [x]  Plan of Care Reviewed   []  PT/OT Reviewed   []  Order Changes  []  Discharge Plans Reviewed  [x]  Advance Directive on file with Nursing Home.   [x]  POA on file with Nursing Home.   [x]  Code Status: I have reviewed the CODE STATUS on file at the [facility] nursing facility       TESSA Chacko.

## 2020-01-01 ENCOUNTER — NURSING HOME (OUTPATIENT)
Dept: FAMILY MEDICINE CLINIC | Facility: CLINIC | Age: 85
End: 2020-01-01

## 2020-01-01 ENCOUNTER — DOCUMENTATION (OUTPATIENT)
Dept: FAMILY MEDICINE CLINIC | Facility: CLINIC | Age: 85
End: 2020-01-01
Payer: MEDICARE

## 2020-01-01 ENCOUNTER — TELEPHONE (OUTPATIENT)
Dept: FAMILY MEDICINE CLINIC | Facility: CLINIC | Age: 85
End: 2020-01-01

## 2020-01-01 VITALS
DIASTOLIC BLOOD PRESSURE: 76 MMHG | OXYGEN SATURATION: 94 % | WEIGHT: 110 LBS | HEART RATE: 98 BPM | TEMPERATURE: 97.8 F | BODY MASS INDEX: 20.78 KG/M2 | SYSTOLIC BLOOD PRESSURE: 116 MMHG | RESPIRATION RATE: 16 BRPM

## 2020-01-01 VITALS
TEMPERATURE: 98 F | WEIGHT: 110 LBS | BODY MASS INDEX: 20.78 KG/M2 | OXYGEN SATURATION: 95 % | DIASTOLIC BLOOD PRESSURE: 72 MMHG | SYSTOLIC BLOOD PRESSURE: 124 MMHG | RESPIRATION RATE: 18 BRPM | HEART RATE: 72 BPM

## 2020-01-01 VITALS
RESPIRATION RATE: 18 BRPM | SYSTOLIC BLOOD PRESSURE: 126 MMHG | OXYGEN SATURATION: 96 % | WEIGHT: 112 LBS | HEART RATE: 78 BPM | BODY MASS INDEX: 21.16 KG/M2 | TEMPERATURE: 97.4 F | DIASTOLIC BLOOD PRESSURE: 84 MMHG

## 2020-01-01 VITALS
DIASTOLIC BLOOD PRESSURE: 74 MMHG | RESPIRATION RATE: 18 BRPM | HEART RATE: 70 BPM | SYSTOLIC BLOOD PRESSURE: 130 MMHG | WEIGHT: 123 LBS | BODY MASS INDEX: 23.24 KG/M2 | TEMPERATURE: 97.6 F

## 2020-01-01 VITALS
OXYGEN SATURATION: 95 % | WEIGHT: 110 LBS | BODY MASS INDEX: 20.78 KG/M2 | SYSTOLIC BLOOD PRESSURE: 124 MMHG | HEART RATE: 72 BPM | RESPIRATION RATE: 18 BRPM | DIASTOLIC BLOOD PRESSURE: 72 MMHG | TEMPERATURE: 98 F

## 2020-01-01 VITALS
WEIGHT: 122 LBS | SYSTOLIC BLOOD PRESSURE: 110 MMHG | HEART RATE: 68 BPM | RESPIRATION RATE: 20 BRPM | TEMPERATURE: 97.8 F | BODY MASS INDEX: 23.05 KG/M2 | DIASTOLIC BLOOD PRESSURE: 74 MMHG

## 2020-01-01 VITALS
HEART RATE: 76 BPM | BODY MASS INDEX: 23.05 KG/M2 | OXYGEN SATURATION: 94 % | DIASTOLIC BLOOD PRESSURE: 68 MMHG | SYSTOLIC BLOOD PRESSURE: 126 MMHG | TEMPERATURE: 98.2 F | RESPIRATION RATE: 16 BRPM | WEIGHT: 122 LBS

## 2020-01-01 VITALS
HEART RATE: 64 BPM | DIASTOLIC BLOOD PRESSURE: 80 MMHG | WEIGHT: 117 LBS | SYSTOLIC BLOOD PRESSURE: 114 MMHG | TEMPERATURE: 97.8 F | RESPIRATION RATE: 18 BRPM | BODY MASS INDEX: 22.11 KG/M2

## 2020-01-01 VITALS
OXYGEN SATURATION: 93 % | SYSTOLIC BLOOD PRESSURE: 120 MMHG | RESPIRATION RATE: 16 BRPM | DIASTOLIC BLOOD PRESSURE: 68 MMHG | HEART RATE: 66 BPM | TEMPERATURE: 96.9 F

## 2020-01-01 VITALS
WEIGHT: 110 LBS | SYSTOLIC BLOOD PRESSURE: 126 MMHG | TEMPERATURE: 97.8 F | HEART RATE: 72 BPM | BODY MASS INDEX: 20.78 KG/M2 | OXYGEN SATURATION: 96 % | DIASTOLIC BLOOD PRESSURE: 70 MMHG | RESPIRATION RATE: 18 BRPM

## 2020-01-01 VITALS
TEMPERATURE: 97.9 F | BODY MASS INDEX: 23.24 KG/M2 | WEIGHT: 123 LBS | SYSTOLIC BLOOD PRESSURE: 114 MMHG | DIASTOLIC BLOOD PRESSURE: 46 MMHG | OXYGEN SATURATION: 96 % | RESPIRATION RATE: 18 BRPM | HEART RATE: 78 BPM

## 2020-01-01 VITALS
SYSTOLIC BLOOD PRESSURE: 122 MMHG | DIASTOLIC BLOOD PRESSURE: 58 MMHG | HEART RATE: 80 BPM | WEIGHT: 122 LBS | BODY MASS INDEX: 23.05 KG/M2 | RESPIRATION RATE: 16 BRPM | TEMPERATURE: 98 F

## 2020-01-01 VITALS
WEIGHT: 122 LBS | OXYGEN SATURATION: 98 % | HEART RATE: 64 BPM | SYSTOLIC BLOOD PRESSURE: 120 MMHG | BODY MASS INDEX: 23.05 KG/M2 | TEMPERATURE: 98 F | DIASTOLIC BLOOD PRESSURE: 60 MMHG | RESPIRATION RATE: 18 BRPM

## 2020-01-01 VITALS
RESPIRATION RATE: 18 BRPM | BODY MASS INDEX: 20.78 KG/M2 | TEMPERATURE: 97.3 F | WEIGHT: 110 LBS | HEART RATE: 62 BPM | SYSTOLIC BLOOD PRESSURE: 116 MMHG | OXYGEN SATURATION: 96 % | DIASTOLIC BLOOD PRESSURE: 60 MMHG

## 2020-01-01 VITALS
DIASTOLIC BLOOD PRESSURE: 54 MMHG | TEMPERATURE: 97.9 F | SYSTOLIC BLOOD PRESSURE: 102 MMHG | BODY MASS INDEX: 23.05 KG/M2 | RESPIRATION RATE: 18 BRPM | OXYGEN SATURATION: 97 % | WEIGHT: 122 LBS | HEART RATE: 80 BPM

## 2020-01-01 VITALS
BODY MASS INDEX: 20.78 KG/M2 | TEMPERATURE: 97.3 F | RESPIRATION RATE: 18 BRPM | DIASTOLIC BLOOD PRESSURE: 70 MMHG | WEIGHT: 110 LBS | SYSTOLIC BLOOD PRESSURE: 122 MMHG | HEART RATE: 86 BPM

## 2020-01-01 VITALS
OXYGEN SATURATION: 97 % | RESPIRATION RATE: 18 BRPM | SYSTOLIC BLOOD PRESSURE: 120 MMHG | WEIGHT: 107 LBS | HEART RATE: 87 BPM | BODY MASS INDEX: 20.22 KG/M2 | TEMPERATURE: 98.2 F | DIASTOLIC BLOOD PRESSURE: 70 MMHG

## 2020-01-01 VITALS
RESPIRATION RATE: 18 BRPM | SYSTOLIC BLOOD PRESSURE: 110 MMHG | WEIGHT: 122 LBS | TEMPERATURE: 98.3 F | HEART RATE: 86 BPM | DIASTOLIC BLOOD PRESSURE: 62 MMHG | BODY MASS INDEX: 23.05 KG/M2

## 2020-01-01 VITALS
RESPIRATION RATE: 20 BRPM | TEMPERATURE: 97.6 F | OXYGEN SATURATION: 98 % | HEART RATE: 72 BPM | BODY MASS INDEX: 20.78 KG/M2 | DIASTOLIC BLOOD PRESSURE: 66 MMHG | SYSTOLIC BLOOD PRESSURE: 132 MMHG | WEIGHT: 110 LBS

## 2020-01-01 DIAGNOSIS — Z74.09 IMPAIRED MOBILITY AND ADLS: Chronic | ICD-10-CM

## 2020-01-01 DIAGNOSIS — D18.01 HEMANGIOMA OF SKIN: ICD-10-CM

## 2020-01-01 DIAGNOSIS — F01.518 VASCULAR DEMENTIA WITH BEHAVIOR DISTURBANCE (HCC): Chronic | ICD-10-CM

## 2020-01-01 DIAGNOSIS — J44.9 CHRONIC OBSTRUCTIVE BRONCHITIS (HCC): Chronic | ICD-10-CM

## 2020-01-01 DIAGNOSIS — F39 MOOD DISORDER (HCC): Primary | ICD-10-CM

## 2020-01-01 DIAGNOSIS — L40.9 SCALP PSORIASIS: Primary | ICD-10-CM

## 2020-01-01 DIAGNOSIS — F39 MOOD DISORDER (HCC): ICD-10-CM

## 2020-01-01 DIAGNOSIS — Z78.9 IMPAIRED MOBILITY AND ADLS: Chronic | ICD-10-CM

## 2020-01-01 DIAGNOSIS — I48.20 CHRONIC ATRIAL FIBRILLATION (HCC): Chronic | ICD-10-CM

## 2020-01-01 DIAGNOSIS — L85.3 DRY SKIN: Primary | ICD-10-CM

## 2020-01-01 DIAGNOSIS — Z51.5 HOSPICE CARE PATIENT: ICD-10-CM

## 2020-01-01 DIAGNOSIS — R54 AGE-RELATED PHYSICAL DEBILITY: Chronic | ICD-10-CM

## 2020-01-01 DIAGNOSIS — R53.81 DECLINING FUNCTIONAL STATUS: ICD-10-CM

## 2020-01-01 DIAGNOSIS — I10 ESSENTIAL HYPERTENSION: Chronic | ICD-10-CM

## 2020-01-01 DIAGNOSIS — F01.518 VASCULAR DEMENTIA WITH BEHAVIOR DISTURBANCE (HCC): ICD-10-CM

## 2020-01-01 DIAGNOSIS — Z78.9 IMPAIRED MOBILITY AND ADLS: Primary | Chronic | ICD-10-CM

## 2020-01-01 DIAGNOSIS — L89.322 PRESSURE INJURY OF LEFT BUTTOCK, STAGE 2 (HCC): ICD-10-CM

## 2020-01-01 DIAGNOSIS — F01.518 VASCULAR DEMENTIA WITH BEHAVIOR DISTURBANCE (HCC): Primary | ICD-10-CM

## 2020-01-01 DIAGNOSIS — E03.9 ACQUIRED HYPOTHYROIDISM: ICD-10-CM

## 2020-01-01 DIAGNOSIS — N18.30 CKD (CHRONIC KIDNEY DISEASE) STAGE 3, GFR 30-59 ML/MIN (HCC): ICD-10-CM

## 2020-01-01 DIAGNOSIS — Z74.09 IMPAIRED MOBILITY AND ADLS: ICD-10-CM

## 2020-01-01 DIAGNOSIS — R63.4 WEIGHT LOSS: Primary | ICD-10-CM

## 2020-01-01 DIAGNOSIS — R53.81 DECLINING FUNCTIONAL STATUS: Primary | ICD-10-CM

## 2020-01-01 DIAGNOSIS — L85.3 DRY SKIN: ICD-10-CM

## 2020-01-01 DIAGNOSIS — R54 AGE-RELATED PHYSICAL DEBILITY: ICD-10-CM

## 2020-01-01 DIAGNOSIS — R63.0 DECREASED APPETITE: ICD-10-CM

## 2020-01-01 DIAGNOSIS — J44.9 CHRONIC OBSTRUCTIVE BRONCHITIS (HCC): ICD-10-CM

## 2020-01-01 DIAGNOSIS — Z78.9 IMPAIRED MOBILITY AND ADLS: ICD-10-CM

## 2020-01-01 DIAGNOSIS — Z74.09 IMPAIRED MOBILITY AND ADLS: Primary | Chronic | ICD-10-CM

## 2020-01-01 DIAGNOSIS — R63.4 WEIGHT LOSS: ICD-10-CM

## 2020-01-01 DIAGNOSIS — I48.20 CHRONIC ATRIAL FIBRILLATION (HCC): ICD-10-CM

## 2020-01-01 DIAGNOSIS — R63.0 DECREASED APPETITE: Primary | ICD-10-CM

## 2020-01-01 DIAGNOSIS — M19.90 ARTHRITIS: Primary | ICD-10-CM

## 2020-01-01 DIAGNOSIS — B35.9 TINEA: Primary | ICD-10-CM

## 2020-01-01 DIAGNOSIS — I10 ESSENTIAL HYPERTENSION: ICD-10-CM

## 2020-01-01 PROCEDURE — 99309 SBSQ NF CARE MODERATE MDM 30: CPT | Performed by: NURSE PRACTITIONER

## 2020-01-01 PROCEDURE — 99308 SBSQ NF CARE LOW MDM 20: CPT | Performed by: NURSE PRACTITIONER

## 2020-01-01 PROCEDURE — 99309 SBSQ NF CARE MODERATE MDM 30: CPT | Performed by: FAMILY MEDICINE

## 2020-01-01 RX ORDER — ALPRAZOLAM 0.25 MG/1
0.25 TABLET ORAL 2 TIMES DAILY PRN
Qty: 60 TABLET | Refills: 5 | Status: SHIPPED | OUTPATIENT
Start: 2020-01-01 | End: 2020-01-01 | Stop reason: SDUPTHER

## 2020-01-01 RX ORDER — HYDROCODONE BITARTRATE AND ACETAMINOPHEN 5; 325 MG/1; MG/1
1 TABLET ORAL 2 TIMES DAILY
Qty: 60 TABLET | Refills: 0 | Status: SHIPPED | OUTPATIENT
Start: 2020-01-01 | End: 2020-01-01 | Stop reason: SDUPTHER

## 2020-01-01 RX ORDER — HYDROCODONE BITARTRATE AND ACETAMINOPHEN 5; 325 MG/1; MG/1
1 TABLET ORAL 2 TIMES DAILY
Qty: 60 TABLET | Refills: 0 | Status: SHIPPED | OUTPATIENT
Start: 2020-01-01 | End: 2020-01-01

## 2020-01-01 RX ORDER — ALPRAZOLAM 0.25 MG/1
0.25 TABLET ORAL 2 TIMES DAILY PRN
Qty: 60 TABLET | Refills: 5 | Status: SHIPPED | OUTPATIENT
Start: 2020-01-01

## 2020-01-01 RX ORDER — HYDROCODONE BITARTRATE AND ACETAMINOPHEN 5; 325 MG/1; MG/1
1 TABLET ORAL 2 TIMES DAILY
Qty: 120 TABLET | Refills: 0 | Status: SHIPPED | OUTPATIENT
Start: 2020-01-01 | End: 2020-01-01 | Stop reason: SDUPTHER

## 2020-01-01 RX ORDER — HYDROCODONE BITARTRATE AND ACETAMINOPHEN 5; 325 MG/1; MG/1
1 TABLET ORAL 2 TIMES DAILY
COMMUNITY
Start: 2020-01-01 | End: 2020-01-01 | Stop reason: SDUPTHER

## 2020-01-01 RX ORDER — HYDROCODONE BITARTRATE AND ACETAMINOPHEN 5; 325 MG/1; MG/1
1 TABLET ORAL 2 TIMES DAILY
Qty: 60 TABLET | Refills: 0 | Status: SHIPPED | OUTPATIENT
Start: 2020-01-01

## 2020-01-01 RX ORDER — ALPRAZOLAM 0.25 MG/1
0.25 TABLET ORAL 2 TIMES DAILY PRN
Qty: 60 TABLET | Refills: 0 | Status: SHIPPED | OUTPATIENT
Start: 2020-01-01 | End: 2020-01-01 | Stop reason: SDUPTHER

## 2020-01-16 PROBLEM — F39 MOOD DISORDER (HCC): Status: ACTIVE | Noted: 2020-01-01

## 2020-01-16 PROBLEM — E03.9 ACQUIRED HYPOTHYROIDISM: Status: ACTIVE | Noted: 2020-01-01

## 2020-01-16 NOTE — PROGRESS NOTES
Nursing Home Follow Up Note      Tony Marc DO  []  TESSA Bojorquez  []  TESSA Chacko [x]  [address], [place] [address]  Phone: [phone]  Fax: [phone] Edvin Ugalde MD  []  Tyrone Ratliff DO  []  [address] [place] [address]  Phone: [phone]  Fax: [phone]     PATIENT NAME: Jaleesa Marinelli                                                                          YOB: 1929           DATE OF SERVICE: 1/16/2020  FACILITY:   [] [place]    [] [place]    [] [facility]     [x] [facility]    [] Other ______________________________________________________________________     CHIEF COMPLAINT:  F/u dementia, mood disorder, afib, copd, CKD      HISTORY OF PRESENT ILLNESS:   Seen for CMM and long term care needs. Staff notes that patient has yelling, anxiety, and worsening behaviors changes in the early evening hours. No other events.     REVIEW OF SYSTEMS:  Unable to obtain ROS due to dementia.     PAST MEDICAL & SURGICAL HISTORY:   Past Medical History:   Diagnosis Date   • Atrial fibrillation (CMS/HCC)    • COPD (chronic obstructive pulmonary disease) (CMS/HCC)    • Hypertension       Past Surgical History:   Procedure Laterality Date   • HYSTERECTOMY           MEDICATIONS:  I have reviewed and reconciled the patients medication list in the patients chart at the [facility] today.      ALLERGIES:  I have reviewed allergies on file at the [facility]  Allergies   Allergen Reactions   • Macrobid [Nitrofurantoin Macrocrystal] Unknown (See Comments)     unknown         SOCIAL HISTORY:  Social History     Socioeconomic History   • Marital status:      Spouse name: Not on file   • Number of children: Not on file   • Years of education: Not on file   • Highest education level: Not on file   Tobacco Use   • Smoking status: Never Smoker   Substance and Sexual Activity   • Alcohol use: No   • Drug use: No       PHYSICAL  EXAMINATION:   Vitals:    01/16/20 1444   BP: 110/74   Pulse: 68   Resp: 20   Temp: 97.8 °F (36.6 °C)   Weight: 55.3 kg (122 lb)     Constitutional: in bed, yelling, crying  HENT: NCAT, mucous membranes moist  Respiratory: Clear to auscultation bilaterally, respiratory effort normal, on RA   Cardiovascular: RRR, no murmurs, rubs, or gallops  Gastrointestinal: Positive bowel sounds, soft, nondistended  Musculoskeletal: No bilateral ankle edema, PPP  Psychiatric: anxious,  Not cooperative   Neurologic: alert, yelling, confused conversation, crying, unable to calm her down  Skin: No rashes appreciated     RECORDS REVIEW:   I have reviewed labs available at time of visit.  The following are pertinent: creatinine 1.5    ASSESSMENT     Diagnoses and all orders for this visit:    Vascular dementia with behavior disturbance (CMS/HCC)    Chronic obstructive bronchitis (CMS/HCC)    Essential hypertension    Chronic atrial fibrillation    Impaired mobility and ADLs    Age-related physical debility    Mood disorder (CMS/HCC)    Acquired hypothyroidism    CKD (chronic kidney disease) stage 3, GFR 30-59 ml/min (CMS/HCC)        PLAN  -- she is having almost daily behavior disturbance per staff. Will night dose of Risperdal at 1600 to help prevent sundowning. Continue buspar, zoloft, depakote, remeron. I talked with daughter over the phone. I do think she would benefit from low dose benzo prn. Daughter wishes to wait at this time  -- HR controlled. Continue ASA for afib. NO AC due to risks  -- COPD appears well compensated  -- creatinine stable. Continue M,W,F lasix.   -- continue doxasin, BP stable   -- staff to continue to assist with ADL's, medications, and overall care    [x]  Discussed Patient in detail with nursing/staff, addressed all needs today.     [x]  Plan of Care Reviewed   []  PT/OT Reviewed   [x]  Order Changes  []  Discharge Plans Reviewed  [x]  Advance Directive on file with Nursing Home.   [x]  POA on file with  Nursing Home.   [x]  Code Status: I have reviewed the CODE STATUS on file at the skilled nursing facility       TESSA Chacko.

## 2020-01-23 NOTE — PROGRESS NOTES
Nursing Home Follow Up Note      Tony Marc DO  []  TESSA Bojorquez  []  TESSA Chacko [x]  [address], [place] [address]  Phone: [phone]  Fax: [phone] Edvin Ugalde MD  []  Tyrone Ratliff DO  []  [address] [place] [address]  Phone: [phone]  Fax: [phone]     PATIENT NAME: Jaleesa Marinelli                                                                          YOB: 1929           DATE OF SERVICE: 1/23/2020  FACILITY:   [] [place]    [] [place]    [] [facility]     [x] [facility]    [] Other ______________________________________________________________________     CHIEF COMPLAINT:  behaviors      HISTORY OF PRESENT ILLNESS:   Staff notes continue behaviors even with moving up time on risperdal. Family interested in prn medication   Patient denies pain. Upon entering the room she is praying repeatedly for forgiveness.    REVIEW OF SYSTEMS:  Unable to obtain ROS due to dementia.     PAST MEDICAL & SURGICAL HISTORY:   Past Medical History:   Diagnosis Date   • Atrial fibrillation (CMS/HCC)    • COPD (chronic obstructive pulmonary disease) (CMS/[facility])    • Hypertension       Past Surgical History:   Procedure Laterality Date   • HYSTERECTOMY           MEDICATIONS:  I have reviewed and reconciled the patients medication list in the patients chart at the [facility] nursing [facility] today.      ALLERGIES:  I have reviewed allergies on file at the [facility]  Allergies   Allergen Reactions   • Macrobid [Nitrofurantoin Macrocrystal] Unknown (See Comments)     unknown         SOCIAL HISTORY:  Social History     Socioeconomic History   • Marital status:      Spouse name: Not on file   • Number of children: Not on file   • Years of education: Not on file   • Highest education level: Not on file   Tobacco Use   • Smoking status: Never Smoker   Substance and Sexual Activity   • Alcohol use: No   • Drug use: No       PHYSICAL EXAMINATION:    Vitals:    01/23/20 1546   BP: 110/62   Pulse: 86   Resp: 18   Temp: 98.3 °F (36.8 °C)   Weight: 55.3 kg (122 lb)     Constitutional: NAD  HENT: NCAT, mucous membranes moist  Respiratory: Clear to auscultation bilaterally, respiratory effort normal, on RA   Cardiovascular: RRR, no murmurs, rubs, or gallops  Gastrointestinal: Positive bowel sounds, soft, nondistended  Musculoskeletal: No bilateral ankle edema, PPP  Psychiatric: quiet  Neurologic: speech clear, confused  Skin: No rashes appreciated     RECORDS REVIEW:   I have reviewed labs available at time of visit.      ASSESSMENT     Diagnoses and all orders for this visit:    Mood disorder (CMS/HCC)  -     ALPRAZolam (XANAX) 0.25 MG tablet; Take 1 tablet by mouth 2 (Two) Times a Day As Needed for Anxiety.    Vascular dementia with behavior disturbance (CMS/HCC)  -     ALPRAZolam (XANAX) 0.25 MG tablet; Take 1 tablet by mouth 2 (Two) Times a Day As Needed for Anxiety.    Impaired mobility and ADLs        PLAN  -- discussed with daughter over the phone. She is already on zoloft, remeron, depakote, risperdal and buspar. She is still having behaviors. I recommend low dose benzo prn. She is agreeable. Consider discontinuing buspar if she tolerates benzo  -- staff to continue to assist with ADL's, medications, and overall care    [x]  Discussed Patient in detail with nursing/staff, addressed all needs today.     [x]  Plan of Care Reviewed   []  PT/OT Reviewed   [x]  Order Changes  []  Discharge Plans Reviewed  [x]  Advance Directive on file with Nursing Home.   [x]  POA on file with Nursing Home.   [x]  Code Status: I have reviewed the CODE STATUS on file at the skilled nursing facility       TESSA Chacko.

## 2020-02-17 NOTE — PROGRESS NOTES
Nursing Home Follow Up Note      Tony Marc DO []   TESSA Bojorquez [x]  [address] [facility] [address] [facility], [place] [address]  Phone: [phone]  Fax: [phone] Edvin Ugalde MD []    Tyrone Ratliff DO []   [address] [place] [address]  Phone: [phone]  Fax: [phone]     PATIENT NAME: Jaleesa Marinelli                                                                          YOB: 1929           DATE OF SERVICE: 2/14/2020  FACILITY:  [][place]   [] [place]   [] [facility]   [x] [facility]   [] Other ______________________________________________________________________      CHIEF COMPLAINT:  F/u behaviors    HISTORY OF PRESENT ILLNESS:   4 weeks ago, patient was started on Risperdal, and 3 weeks ago, Ativan, for increased behaviors and yelling nearly all evening. Per staff, her behaviors have improved some, but not much, but family will not allow medications to be adjusted.     Coordination of LTC needs and chronic conditions as listed below:    PAST MEDICAL & SURGICAL HISTORY:   Past Medical History:   Diagnosis Date   • Atrial fibrillation (CMS/HCC)    • COPD (chronic obstructive pulmonary disease) (CMS/HCC)    • Hypertension       Past Surgical History:   Procedure Laterality Date   • HYSTERECTOMY           MEDICATIONS:  I have reviewed and reconciled the patients medication list in the patients chart at the skilled nursing facility today.      ALLERGIES:    Allergies   Allergen Reactions   • Macrobid [Nitrofurantoin Macrocrystal] Unknown (See Comments)     unknown         SOCIAL HISTORY:    Social History     Socioeconomic History   • Marital status:      Spouse name: Not on file   • Number of children: Not on file   • Years of education: Not on file   • Highest education level: Not on file   Tobacco Use   • Smoking status: Never Smoker   Substance and Sexual Activity   • Alcohol use: No   • Drug use: No       FAMILY HISTORY:    No family history on file.    REVIEW OF  SYSTEMS:    Review of Systems   Constitutional: Negative for activity change, appetite change, chills, diaphoresis, fatigue, fever, unexpected weight gain and unexpected weight loss.   HENT: Negative for congestion, ear pain, mouth sores, nosebleeds, postnasal drip, rhinorrhea, sinus pressure, sneezing, sore throat, swollen glands and trouble swallowing.    Eyes: Negative for blurred vision, pain, discharge, redness, itching and visual disturbance.   Respiratory: Negative for apnea, cough, choking, chest tightness, shortness of breath, wheezing and stridor.    Cardiovascular: Negative for chest pain, palpitations and leg swelling.   Gastrointestinal: Negative for abdominal distention, abdominal pain, blood in stool, constipation, diarrhea, nausea, vomiting, GERD and indigestion.   Endocrine: Negative for polydipsia and polyuria.   Genitourinary: Negative for decreased urine volume, difficulty urinating, dysuria, flank pain, frequency, hematuria, urgency and urinary incontinence.   Musculoskeletal: Positive for arthralgias and gait problem. Negative for back pain, joint swelling and myalgias.   Skin: Negative for color change, dry skin, rash and skin lesions.   Allergic/Immunologic: Negative for environmental allergies.   Neurological: Positive for weakness, memory problem and confusion. Negative for dizziness, seizures and speech difficulty.   Psychiatric/Behavioral: Positive for behavioral problems. Negative for dysphoric mood, hallucinations, sleep disturbance and depressed mood. The patient is not nervous/anxious.          PHYSICAL EXAMINATION:   VITAL SIGNS:   Vitals:    02/14/20 1126   BP: 130/74   Pulse: 70   Resp: 18   Temp: 97.6 °F (36.4 °C)   Weight: 55.8 kg (123 lb)       Physical Exam   Constitutional: She appears well-developed and well-nourished.   HENT:   Head: Normocephalic.   Right Ear: External ear normal.   Left Ear: External ear normal.   Nose: Nose normal.   Eyes: Conjunctivae are normal.   Neck:  Normal range of motion.   Cardiovascular: Normal rate, regular rhythm and intact distal pulses.   Pulmonary/Chest: Effort normal and breath sounds normal. No respiratory distress. She has no wheezes. She has no rales.   Abdominal: Soft. Bowel sounds are normal. She exhibits no distension and no mass. There is no tenderness. No hernia.   Musculoskeletal: She exhibits no edema or tenderness.   Neurological: She is alert.   Skin: Skin is warm and dry. No rash noted.   Psychiatric: She has a normal mood and affect. She is agitated (yells out often if family not with her). Cognition and memory are impaired.   Nursing note and vitals reviewed.      RECORDS REVIEW:   I have reviewed and interpreted the following labs at today's visit: 10/16/18: GFR 47, Creat. 1.1    ASSESSMENT     Diagnoses and all orders for this visit:    Vascular dementia with behavior disturbance (CMS/HCC)    Mood disorder (CMS/HCC)    Impaired mobility and ADLs        PLAN    Dementia/Mood disorder  -Continue Zoloft, Buspar, Risperdal, Depakote and Ativan, as directed. Staff to discuss with family Monday, increasing Risperdal, since behaviors continue. Will discuss with family as well, if they wish.     Staff to continue supportive care for all ADLs.     Patient,  and staff,  encouraged to keep me informed of any acute changes, lack of improvement, or any new concerning symptoms.       [x]  Discussed Patient in detail with nursing/staff, addressed all needs today.     [x]  Plan of Care Reviewed   []  PT/OT Reviewed   [x]  Order Changes  []  Discharge Plans Reviewed  [x]  Advance Directive on file with Nursing Home.   [x]  POA on file with Nursing Home.   [x]  Code Status listed: []  Full Code   [x]  DNR     “I confirm accuracy of unchanged data/findings which have been carried forward from previous visit, as well as I have updated appropriately those that have changed.”        Dominique Oliveira, APRN.

## 2020-03-05 NOTE — PROGRESS NOTES
Nursing Home Follow Up Note      Tony Marc DO  []  TESSA Bojorquez  []  TESSA Chacko [x]  [address] [facility] [address] [facility], [place] [address]  Phone: [phone]  Fax: [phone] Edvin Ugalde MD  []  Tyrone Ratliff DO  []  [address] [place] [address]  Phone: [phone]  Fax: [phone]     PATIENT NAME: Jaleesa Marinelli                                                                          YOB: 1929           DATE OF SERVICE: 3-5-2020  FACILITY:   [] [place]    [] [place]    [] [facility]     [x] [facility]    [] Other ______________________________________________________________________     CHIEF COMPLAINT:  Scalp rash      HISTORY OF PRESENT ILLNESS:   Family requesting alternative treatment to known scalp rash. Has not seen improvement with ketoconazole. Also she has a skin lesion on her left upper anterior chest that family is worried about    REVIEW OF SYSTEMS:  Unable to obtain due to mentation    PAST MEDICAL & SURGICAL HISTORY:   Past Medical History:   Diagnosis Date   • Atrial fibrillation (CMS/HCC)    • COPD (chronic obstructive pulmonary disease) (CMS/[facility])    • Hypertension       Past Surgical History:   Procedure Laterality Date   • HYSTERECTOMY           MEDICATIONS:  I have reviewed and reconciled the patients medication list in the patients chart at the [facility] today.      ALLERGIES:  I have reviewed allergies on file at the [facility]  Allergies   Allergen Reactions   • Macrobid [Nitrofurantoin Macrocrystal] Unknown (See Comments)     unknown         SOCIAL HISTORY:  Social History     Socioeconomic History   • Marital status:      Spouse name: Not on file   • Number of children: Not on file   • Years of education: Not on file   • Highest education level: Not on file   Tobacco Use   • Smoking status: Never Smoker   Substance and Sexual Activity   • Alcohol use: No   • Drug use: No       PHYSICAL EXAMINATION:    Vitals:    03/05/20 1429   BP: 122/58   Pulse: 80   Resp: 16   Temp: 98 °F (36.7 °C)   Weight: 55.3 kg (122 lb)     Constitutional: No acute distress, asleep, easily awakens  HENT: NCAT, mucous membranes moist  Respiratory: Clear to auscultation bilaterally, respiratory effort normal, on RA  Cardiovascular: RRR, no murmurs, rubs, or gallops  Gastrointestinal: Positive bowel sounds, soft, nontender, nondistended  Musculoskeletal: No bilateral ankle edema, PPP  Psychiatric: flat and quiet  Neurologic: drowsy, quiet, appears at baseline severe dementia  Skin: erythematous flaky rash noted on scalp. hemangioma noted on left anterior chest    RECORDS REVIEW:   I have reviewed labs available at time of visit.     ASSESSMENT     Diagnoses and all orders for this visit:    Scalp psoriasis    Hemangioma of skin        PLAN  -- stop ketoconazole  -- lidex solution daily until resolves  -- skin lesion consistent with hemangioma.     [x]  Discussed Patient in detail with nursing/staff, addressed all needs today.     [x]  Plan of Care Reviewed   []  PT/OT Reviewed   [x]  Order Changes  []  Discharge Plans Reviewed  [x]  Advance Directive on file with Nursing Home.   [x]  POA on file with Nursing Home.   [x]  Code Status: I have reviewed the CODE STATUS on file at the skilled nursing facility       TESSA Chacko.

## 2020-03-25 NOTE — PROGRESS NOTES
Nursing Home Progress Note        Tony Marc DO [x]  TESSA Bojorquez []  [address] [place] [address]  Phone: [phone]  Fax: [phone] Edvin Ugalde MD []  Tyrone Ratliff DO []  [address] [place] [address]  Phone: [phone]  Fax: [phone]     PATIENT NAME: Jaleesa Marinelli                                                                          YOB: 1929           DATE OF SERVICE: 03/25/2020  FACILITY: []  [facility]  [] [facility]  []  [facility]  [x] [facility] [place]  []  Other ______________________________________________________________________     CHIEF COMPLAINT:  Chronic medical management and long-term care      HISTORY OF PRESENT ILLNESS:   [x]  Follow Up visit for coordination of long term care issues and chronic medical management of Diagnoses and all orders for this visit:    Impaired mobility and ADLs    Age-related physical debility    Vascular dementia with behavior disturbance (CMS/HCC)    Acquired hypothyroidism    Essential hypertension    Chronic atrial fibrillation    Chronic obstructive bronchitis (CMS/HCC)    Without reports of any falls since last visit.  Has spent increased time out of bed in a rolling chair, even spending times up at the nurses station for extended periods of time.    Patient is currently on as needed alprazolam, which is provided significant improvement to her dementia associated agitation and behaviors.    She continues on thyroid supplementation.    No signs of demonstrated hemodynamic stability.    No reports of any palpitations.    There have been no reports of any aspiration of focal/cyclical nature, no worsening respiratory symptoms.      PAST MEDICAL & SURGICAL HISTORY:   Past Medical History:   Diagnosis Date   • Atrial fibrillation (CMS/HCC)    • COPD (chronic obstructive pulmonary disease) (CMS/HCC)    • Hypertension       Past Surgical History:   Procedure Laterality Date   • HYSTERECTOMY            MEDICATIONS:  I have reviewed and reconciled the patients medication list in the patients chart at the skilled nursing facility today.      ALLERGIES:    Allergies   Allergen Reactions   • Macrobid [Nitrofurantoin Macrocrystal] Unknown (See Comments)     unknown         SOCIAL HISTORY:    Social History     Socioeconomic History   • Marital status:      Spouse name: Not on file   • Number of children: Not on file   • Years of education: Not on file   • Highest education level: Not on file   Tobacco Use   • Smoking status: Never Smoker   Substance and Sexual Activity   • Alcohol use: No   • Drug use: No       FAMILY HISTORY:    No family history on file.    REVIEW OF SYSTEMS:    Review of Systems  · Appetite: Fair [x]   Good []   Poor []   Weight Loss []  [x]  Weight Stable   Unavoidable Weight Loss []  Tolerating Tube Feeding []    Supplements Provided []   Due to advanced dementia, patient is unable to fully participate in review of systems.  These answers are obtained on direct consultation with patient's treating nurse, staff members and review of her medical records and notes.    PHYSICAL EXAMINATION:   VITAL SIGNS:   Vitals:    03/25/20 0820   BP: 120/60   Pulse: 64   Resp: 18   Temp: 98 °F (36.7 °C)   SpO2: 98%       Physical Exam    General Appearance:  [x]  Alert   [x]  Oriented x person  [x]  No acute distress     [x]  Confused  []  Disoriented   []  Comatose   Head:  Atraumatic and normocephalic, without obvious abnormality.   Eyes:         PERRLA, conjunctivae and sclerae normal, no Icterus. No pallor. Extra-occular movements are within normal limits.   Ears:  Ears appear intact with no abnormalities noted.   Throat: No oral lesions, no thrush, oral mucosa moist.   Neck: Supple, trachea midline, no thyromegaly, no carotid bruit.   Back:   No kyphoscoliosis. No tenderness to palpation.   Lungs:   Chest shape is normal. Breath sounds heard bilaterally equally.  No wheezing.    Rhonchus referred  upper airway congestion.    Heart:  Normal S1 and S2, no murmur, no gallop, no rub. No JVD.   Abdomen:   Normal bowel sounds, no masses, no organomegaly. Soft, non-tender, non-distended, no guarding    Extremities: Moves all extremities.  Without edema, cyanosis or clubbing.  Frail build.   Poor core strength and stability.   Pulses: Pulses palpable and equal bilaterally.   Skin: No bleeding or rash.  Generalized dry skin noted.  Age-related atrophy of skin.   Neurologic: [x] Normal speech []  Normal mental status    [x] Cranial nerves II through XII intact   [x]  No anosmia [x]  DTR 2+ [x]  Proprioception intact  [x]  No focal motor/sensory deficits      Psych/Mood:                    [x]  No acute changes []  Depressed  Urinary:                            []  Continent  [x]  Incontinent []  Retention  []  F/C      []  UTI w/treatment in progress         ASSESSMENT     Diagnoses and all orders for this visit:    1. Impaired mobility and ADLs (Primary)    2. Age-related physical debility    3. Vascular dementia with behavior disturbance (CMS/[facility])    4. Acquired hypothyroidism    5. Essential hypertension    6. Chronic atrial fibrillation    7. Chronic obstructive bronchitis (CMS/[facility])          PLAN  Continue supportive care for all mobilization and transfers.  Please the patient is spending increased time out of bed and in a more socialized since.  Continue ADL assistance as needed.    Continue thyroid supplementation, surveillance labs as needed and dose adjustment as appropriate.    Vital signs are stable, blood pressure is at goal.    Heart rate well controlled.    Continue current respiratory regimen.    [x]  Discussed Patient in detail with nursing/staff, addressed all needs today.     [x]  Plan of Care Reviewed   []  PT/OT Reviewed   []  Order Changes  []  Discharge Plans Reviewed   []  Code Status Changes         Tony Marc DO  03/25/2020

## 2020-04-08 NOTE — PROGRESS NOTES
Nursing Home Follow Up Note      Tony Marc DO []   TESSA Bojorquez [x]  [address] [facility], [place] [address]  Phone: [phone]  Fax: [phone] Edvin Ugalde MD []    Tyrone Ratliff DO []   [address] [place] [address]  Phone: [phone]  Fax: [phone]     PATIENT NAME: Jaleesa Marinelli                                                                          YOB: 1929           DATE OF SERVICE: 4/7/2020  FACILITY:  [][facility]   [] [facility]   [] [facility]   [x] [facility] [place]   [] Other ______________________________________________________________________      CHIEF COMPLAINT:  F/u dementia and mood    HISTORY OF PRESENT ILLNESS:   Follow up on dementia related behaviors, to assess for any changes, related to COVID 19 visitor restriction policy. Her family came to see her every day, prior to this, and can not come now, so assessment needed of her behaviors. Per staff, they have remained at baseline for her. At times she is fine, rests in be quietly, at other times, she yells out. She has not had any worsening behaviors and her appetite and weight have remained stable.     Coordination of LTC needs and chronic conditions as listed below:    PAST MEDICAL & SURGICAL HISTORY:   Past Medical History:   Diagnosis Date   • Atrial fibrillation (CMS/HCC)    • COPD (chronic obstructive pulmonary disease) (CMS/HCC)    • Hypertension       Past Surgical History:   Procedure Laterality Date   • HYSTERECTOMY           MEDICATIONS:  I have reviewed and reconciled the patients medication list in the patients chart at the skilled nursing facility today.      ALLERGIES:    Allergies   Allergen Reactions   • Macrobid [Nitrofurantoin Macrocrystal] Unknown (See Comments)     unknown         SOCIAL HISTORY:    Social History     Socioeconomic History   • Marital status:      Spouse name: Not on file   • Number of children: Not on file   • Years of education: Not on file   • Highest  education level: Not on file   Tobacco Use   • Smoking status: Never Smoker   • Smokeless tobacco: Never Used   Substance and Sexual Activity   • Alcohol use: No   • Drug use: No       FAMILY HISTORY:    No family history on file.    REVIEW OF SYSTEMS:    Review of Systems   Constitutional: Negative for activity change, appetite change, chills, diaphoresis, fatigue, fever, unexpected weight gain and unexpected weight loss.   HENT: Negative for congestion, ear pain, mouth sores, nosebleeds, postnasal drip, rhinorrhea, sinus pressure, sneezing, sore throat, swollen glands and trouble swallowing.    Eyes: Negative for blurred vision, pain, discharge, redness, itching and visual disturbance.   Respiratory: Negative for apnea, cough, choking, chest tightness, shortness of breath, wheezing and stridor.    Cardiovascular: Negative for chest pain, palpitations and leg swelling.   Gastrointestinal: Negative for abdominal distention, abdominal pain, blood in stool, constipation, diarrhea, nausea, vomiting, GERD and indigestion.   Endocrine: Negative for polydipsia and polyuria.   Genitourinary: Negative for decreased urine volume, difficulty urinating, dysuria, flank pain, frequency, hematuria, urgency and urinary incontinence.   Musculoskeletal: Positive for arthralgias and gait problem. Negative for back pain, joint swelling and myalgias.   Skin: Negative for color change, dry skin, rash and skin lesions.   Allergic/Immunologic: Negative for environmental allergies.   Neurological: Positive for weakness, memory problem and confusion. Negative for dizziness, seizures and speech difficulty.   Psychiatric/Behavioral: Positive for behavioral problems. Negative for dysphoric mood, hallucinations, sleep disturbance and depressed mood. The patient is not nervous/anxious.          PHYSICAL EXAMINATION:   VITAL SIGNS:   Vitals:    04/07/20 1202   BP: 102/54   Pulse: 80   Resp: 18   Temp: 97.9 °F (36.6 °C)   SpO2: 97%   Weight: 55.3  kg (122 lb)       Physical Exam   Constitutional: She appears well-developed and well-nourished.   HENT:   Head: Normocephalic.   Right Ear: External ear normal.   Left Ear: External ear normal.   Nose: Nose normal.   Eyes: Conjunctivae are normal.   Neck: Normal range of motion.   Cardiovascular: Normal rate, regular rhythm and intact distal pulses.   Pulmonary/Chest: Effort normal and breath sounds normal. No respiratory distress. She has no wheezes. She has no rales.   Abdominal: Soft. Bowel sounds are normal. She exhibits no distension and no mass. There is no tenderness. No hernia.   Musculoskeletal: She exhibits no edema or tenderness.   Neurological: She is alert. She is disoriented.   Skin: Skin is warm and dry. No rash noted.   Psychiatric: She has a normal mood and affect. She is agitated (yells out at times in the evening but this is baseline for her). Cognition and memory are impaired.   Nursing note and vitals reviewed.      RECORDS REVIEW:   I have reviewed and interpreted the most recent labs at today's visit    Advance Care Planning   ACP discussion was declined by the patient. Patient has an advance directive in EMR which is still valid.     ASSESSMENT     Diagnoses and all orders for this visit:    Vascular dementia with behavior disturbance (CMS/HCC)    Mood disorder (CMS/HCC)    Impaired mobility and ADLs    Age-related physical debility        PLAN    Dementia/Mood disorder  -Stable at this time, with no worsening symptoms s/t COVID 19 visitor restriction. Her appetite is stable and her weight has remained the same as last month.     Staff to continue supportive care for all ADLs.     Staff  encouraged to keep me informed of any acute changes, or any new concerning symptoms.       [x]  Discussed Patient in detail with nursing/staff, addressed all needs today.     [x]  Plan of Care Reviewed   []  PT/OT Reviewed   [x]  Order Changes  []  Discharge Plans Reviewed  [x]  Advance Directive on file  with Nursing Home.   [x]  POA on file with Nursing Home.   [x]  Code Status listed: []  Full Code   [x]  DNR     “I confirm accuracy of unchanged data/findings which have been carried forward from previous visit, as well as I have updated appropriately those that have changed.”          Dominique Oliveira, APRN.

## 2020-04-17 NOTE — PROGRESS NOTES
Nursing Home Follow Up Note      Tony Marc DO []   TESAS Bojorquez [x]  [address] [place] [address]  Phone: [phone]  Fax: [phone] Edvin Ugalde MD []    Tyrone Ratliff DO []   [address] [place] [address]  Phone: [phone]  Fax: [phone]     PATIENT NAME: Jaleesa Marinelli                                                                          YOB: 1929           DATE OF SERVICE: 4/15/2020  FACILITY:  [][facility]   [] [facility]   [] [facility]   [x] [facility]   [] Other ______________________________________________________________________      CHIEF COMPLAINT:  F/u dementia and mood    HISTORY OF PRESENT ILLNESS:   Follow up on dementia related behaviors, to assess for any changes, related to COVID 19 visitor restriction policy. Per nursing, she has not had any worsening behaviors but she is not eating as well and has lost 5 pounds since last week.     Coordination of LTC needs and chronic conditions as listed below:    PAST MEDICAL & SURGICAL HISTORY:   Past Medical History:   Diagnosis Date   • Atrial fibrillation (CMS/HCC)    • COPD (chronic obstructive pulmonary disease) (CMS/HCC)    • Hypertension       Past Surgical History:   Procedure Laterality Date   • HYSTERECTOMY           MEDICATIONS:  I have reviewed and reconciled the patients medication list in the patients chart at the skilled nursing facility today.      ALLERGIES:    Allergies   Allergen Reactions   • Macrobid [Nitrofurantoin Macrocrystal] Unknown (See Comments)     unknown         SOCIAL HISTORY:    Social History     Socioeconomic History   • Marital status:      Spouse name: Not on file   • Number of children: Not on file   • Years of education: Not on file   • Highest education level: Not on file   Tobacco Use   • Smoking status: Never Smoker   • Smokeless tobacco: Never Used   Substance and Sexual Activity   • Alcohol use: No   • Drug use: No       FAMILY HISTORY:    No  family history on file.    REVIEW OF SYSTEMS:    Review of Systems   Constitutional: Positive for appetite change and unexpected weight loss. Negative for activity change, chills, diaphoresis, fatigue, fever and unexpected weight gain.   HENT: Negative for congestion, ear pain, mouth sores, nosebleeds, postnasal drip, rhinorrhea, sinus pressure, sneezing, sore throat, swollen glands and trouble swallowing.    Eyes: Negative for blurred vision, pain, discharge, redness, itching and visual disturbance.   Respiratory: Negative for apnea, cough, choking, chest tightness, shortness of breath, wheezing and stridor.    Cardiovascular: Negative for chest pain, palpitations and leg swelling.   Gastrointestinal: Negative for abdominal distention, abdominal pain, blood in stool, constipation, diarrhea, nausea, vomiting, GERD and indigestion.   Endocrine: Negative for polydipsia and polyuria.   Genitourinary: Negative for decreased urine volume, difficulty urinating, dysuria, flank pain, frequency, hematuria, urgency and urinary incontinence.   Musculoskeletal: Positive for arthralgias and gait problem. Negative for back pain, joint swelling and myalgias.   Skin: Negative for color change, dry skin, rash and skin lesions.   Allergic/Immunologic: Negative for environmental allergies.   Neurological: Positive for weakness, memory problem and confusion. Negative for dizziness, seizures and speech difficulty.   Psychiatric/Behavioral: Positive for behavioral problems. Negative for dysphoric mood, hallucinations, sleep disturbance and depressed mood. The patient is not nervous/anxious.          PHYSICAL EXAMINATION:   VITAL SIGNS:   Vitals:    04/15/20 1035   BP: 114/80   Pulse: 64   Resp: 18   Temp: 97.8 °F (36.6 °C)   Weight: 53.1 kg (117 lb)       Physical Exam   Constitutional: She appears well-developed and well-nourished.   HENT:   Head: Normocephalic.   Right Ear: External ear normal.   Left Ear: External ear normal.   Nose:  Nose normal.   Eyes: Conjunctivae are normal.   Neck: Normal range of motion.   Cardiovascular: Normal rate, regular rhythm and intact distal pulses.   Pulmonary/Chest: Effort normal and breath sounds normal. No respiratory distress. She has no wheezes. She has no rales.   Abdominal: Soft. Bowel sounds are normal. She exhibits no distension and no mass. There is no tenderness. No hernia.   Musculoskeletal: She exhibits no edema or tenderness.   Neurological: She is alert. She is disoriented.   Skin: Skin is warm and dry. No rash noted.   Psychiatric: She has a normal mood and affect. She is agitated (yells out at times in the evening but this is baseline for her). Cognition and memory are impaired.   Nursing note and vitals reviewed.      RECORDS REVIEW:   I have reviewed and interpreted the most recent labs at today's visit    Advance Care Planning   ACP discussion was declined by the patient. Patient has an advance directive in EMR which is still valid.     ASSESSMENT     Diagnoses and all orders for this visit:    Vascular dementia with behavior disturbance (CMS/HCC)    Mood disorder (CMS/HCC)    Weight loss    Decreased appetite    Impaired mobility and ADLs    Age-related physical debility        PLAN    Dementia/Mood disorder/wieght loss/decreased appetite  -She is not having any worsening behaviors and moods, but decreased appetite and weight loss. Nursing to start fortified oats back tomorrow, with each meal. They will give her med pass with meals and frequent snacks. She is weighed weekly. Will monitor.     Staff to continue supportive care for all ADLs.     Staff  encouraged to keep me informed of any acute changes, or any new concerning symptoms.       [x]  Discussed Patient in detail with nursing/staff, addressed all needs today.     [x]  Plan of Care Reviewed   []  PT/OT Reviewed   [x]  Order Changes  []  Discharge Plans Reviewed  [x]  Advance Directive on file with Nursing Home.   [x]  POA on file with  Nursing Home.   [x]  Code Status listed: []  Full Code   [x]  DNR     “I confirm accuracy of unchanged data/findings which have been carried forward from previous visit, as well as I have updated appropriately those that have changed.”          Dominique Oliveira, APRN.

## 2020-05-03 NOTE — PROGRESS NOTES
Nursing Home Follow Up Note      Tony Marc DO []   TESSA Bojorquez [x]  [address] [facility], [place] [address]  Phone: [phone]  Fax: [phone] Edvin Ugalde MD []    Tyrone Ratliff DO []   [address] [place] [address]  Phone: [phone]  Fax: [phone]     PATIENT NAME: Jaleesa Marinelli                                                                          YOB: 1929           DATE OF SERVICE: 4/30/2020  FACILITY:  [][facility]   [] [place]   [] [facility]   [x] [facility]   [] Other ______________________________________________________________________      CHIEF COMPLAINT:  Follow up decreased appetite, weight loss    HISTORY OF PRESENT ILLNESS:    Per nursing, patient is still not eating good, even with increasing Remeron. She has lost 4 pounds again, since 4/21. Family is aware.    Coordination of LTC needs and chronic conditions as listed below:    PAST MEDICAL & SURGICAL HISTORY:   Past Medical History:   Diagnosis Date   • Atrial fibrillation (CMS/HCC)    • COPD (chronic obstructive pulmonary disease) (CMS/HCC)    • Hypertension       Past Surgical History:   Procedure Laterality Date   • HYSTERECTOMY           MEDICATIONS:  I have reviewed and reconciled the patients medication list in the patients chart at the skilled nursing facility today.      ALLERGIES:    Allergies   Allergen Reactions   • Macrobid [Nitrofurantoin Macrocrystal] Unknown (See Comments)     unknown         SOCIAL HISTORY:    Social History     Socioeconomic History   • Marital status:      Spouse name: Not on file   • Number of children: Not on file   • Years of education: Not on file   • Highest education level: Not on file   Tobacco Use   • Smoking status: Never Smoker   • Smokeless tobacco: Never Used   Substance and Sexual Activity   • Alcohol use: No   • Drug use: No       FAMILY HISTORY:    No family history on file.    REVIEW OF SYSTEMS:    Review of Systems   Constitutional:  Positive for appetite change and unexpected weight loss. Negative for activity change, chills, diaphoresis, fatigue, fever and unexpected weight gain.   HENT: Negative for congestion, ear pain, mouth sores, nosebleeds, postnasal drip, rhinorrhea, sinus pressure, sneezing, sore throat, swollen glands and trouble swallowing.    Eyes: Negative for blurred vision, pain, discharge, redness, itching and visual disturbance.   Respiratory: Negative for apnea, cough, choking, chest tightness, shortness of breath, wheezing and stridor.    Cardiovascular: Negative for chest pain, palpitations and leg swelling.   Gastrointestinal: Negative for abdominal distention, abdominal pain, blood in stool, constipation, diarrhea, nausea, vomiting, GERD and indigestion.   Endocrine: Negative for polydipsia and polyuria.   Genitourinary: Negative for decreased urine volume, difficulty urinating, dysuria, flank pain, frequency, hematuria, urgency and urinary incontinence.   Musculoskeletal: Positive for arthralgias and gait problem. Negative for back pain, joint swelling and myalgias.   Skin: Negative for color change, dry skin, rash and skin lesions.   Allergic/Immunologic: Negative for environmental allergies.   Neurological: Positive for weakness, memory problem and confusion. Negative for dizziness, seizures and speech difficulty.   Psychiatric/Behavioral: Positive for behavioral problems. Negative for dysphoric mood, hallucinations, sleep disturbance and depressed mood. The patient is not nervous/anxious.          PHYSICAL EXAMINATION:   VITAL SIGNS:   Vitals:    04/30/20 0853   BP: 132/66   Pulse: 72   Resp: 20   Temp: 97.6 °F (36.4 °C)   SpO2: 98%   Weight: 49.9 kg (110 lb)       Physical Exam   Constitutional: She appears well-developed and well-nourished.   HENT:   Head: Normocephalic.   Right Ear: External ear normal.   Left Ear: External ear normal.   Nose: Nose normal.   Eyes: Conjunctivae are normal.   Neck: Normal range of  motion.   Cardiovascular: Normal rate, regular rhythm and intact distal pulses.   Pulmonary/Chest: Effort normal and breath sounds normal. No respiratory distress. She has no wheezes. She has no rales.   Abdominal: Soft. Bowel sounds are normal. She exhibits no distension and no mass. There is no tenderness. No hernia.   Musculoskeletal: She exhibits no edema or tenderness.   Neurological: She is alert. She is disoriented.   Skin: Skin is warm and dry. No rash noted.   Psychiatric: She has a normal mood and affect. She is agitated (yells out at times in the evening but this is baseline for her). Cognition and memory are impaired.   Nursing note and vitals reviewed.      RECORDS REVIEW:   I have reviewed and interpreted the most recent labs at today's visit    Advance Care Planning   ACP discussion was declined by the patient. Patient has an advance directive in EMR which is still valid.     ASSESSMENT     Diagnoses and all orders for this visit:    Weight loss    Decreased appetite    Vascular dementia with behavior disturbance (CMS/HCC)    Mood disorder (CMS/HCC)    Age-related physical debility    Impaired mobility and ADLs        PLAN    Dementia/Mood disorder/wieght loss/decreased appetite  -She is not having any worsening behaviors and moods, but continues to have decreased appetite and weight loss. Nursing also giving fortified oats with each meal, as well as med pass with meals and frequent snacks. Start Marinol 2.5 mg bid with lunch and dinner.  She is weighed weekly. Will monitor. Family is aware.    Staff to continue supportive care for all ADLs.     Staff encouraged to keep me informed of any acute changes, lack of improvement, or any new concerning symptoms.      [x]  Discussed Patient in detail with nursing/staff, addressed all needs today.     [x]  Plan of Care Reviewed   []  PT/OT Reviewed   [x]  Order Changes  []  Discharge Plans Reviewed  [x]  Advance Directive on file with Nursing Home.   [x]  POA  on file with Nursing Home.   [x]  Code Status listed: []  Full Code   [x]  DNR     “I confirm accuracy of unchanged data/findings which have been carried forward from previous visit, as well as I have updated appropriately those that have changed.”            Dominique Oliveira, APRN.

## 2020-05-08 NOTE — PROGRESS NOTES
Nursing Home Follow Up Note      Tony Marc DO  []  TESSA Bojorquez  []  TESSA Chacko [x]  [address] [facility], [place] [address]  Phone: [phone]  Fax: [phone] Edvin Ugalde MD  []  Tyrone Ratliff DO  []  [address] [place] [address]  Phone: [phone]  Fax: [phone]     PATIENT NAME: Jaleesa Marinelli                                                                          YOB: 1929           DATE OF SERVICE: 2/20/2020  FACILITY:   [] [facility]    [] [facility]    [] [facility]     [x] [facility]    [] Other ______________________________________________________________________     CHIEF COMPLAINT:  Follow-up behaviors      HISTORY OF PRESENT ILLNESS:   Patient tolerating addition of Xanax.  Still having behavior disturbances in the afternoon.  Family concerned that she seems to sleepy throughout the day    REVIEW OF SYSTEMS:  Unable to obtain ROS due to dementia.     PAST MEDICAL & SURGICAL HISTORY:   Past Medical History:   Diagnosis Date   • Atrial fibrillation (CMS/HCC)    • COPD (chronic obstructive pulmonary disease) (CMS/HCC)    • Hypertension       Past Surgical History:   Procedure Laterality Date   • HYSTERECTOMY           MEDICATIONS:  I have reviewed and reconciled the patients medication list in the patients chart at the [facility] today.      ALLERGIES:  I have reviewed allergies on file at the [facility]  Allergies   Allergen Reactions   • Macrobid [Nitrofurantoin Macrocrystal] Unknown (See Comments)     unknown         SOCIAL HISTORY:  Social History     Socioeconomic History   • Marital status:      Spouse name: Not on file   • Number of children: Not on file   • Years of education: Not on file   • Highest education level: Not on file   Tobacco Use   • Smoking status: Never Smoker   • Smokeless tobacco: Never Used   Substance and Sexual Activity   • Alcohol use: No   • Drug use: No       PHYSICAL  EXAMINATION:   Vitals:    02/20/20 1427   BP: 114/46   Pulse: 78   Resp: 18   Temp: 97.9 °F (36.6 °C)   SpO2: 96%   Weight: 55.8 kg (123 lb)     Constitutional: Resting in bed  HENT: NCAT, mucous membranes moist  Respiratory: Clear to auscultation bilaterally, respiratory effort normal, on RA   Cardiovascular: RRR, no murmurs, rubs, or gallops  Gastrointestinal: Positive bowel sounds, soft, nondistended  Musculoskeletal: No bilateral ankle edema, PPP  Psychiatric: quiet  Neurologic: speech clear, confused conversation  Skin: No rashes appreciated     RECORDS REVIEW:   I have reviewed labs available at time of visit.      ASSESSMENT     Diagnoses and all orders for this visit:    Mood disorder (CMS/[facility], located [facility])    Impaired mobility and ADLs    Vascular dementia with behavior disturbance (CMS/[facility], located [facility])        PLAN  --Decrease Remeron to 7.5 mg nightly  --Give her Depakote at night  --We will see if this helps with some of her daytime sedation  --Can consider increase Risperdal to 0.5 mg twice a day if her behavior worsens with the above  --Continue Zoloft BuSpar and Xanax  -- staff to continue to assist with ADL's, medications, and overall care      [x]  Discussed Patient in detail with nursing/staff, addressed all needs today.     [x]  Plan of Care Reviewed   []  PT/OT Reviewed   [x]  Order Changes  []  Discharge Plans Reviewed  [x]  Advance Directive on file with Nursing Home.   [x]  POA on file with Nursing Home.   [x]  Code Status: I have reviewed the CODE STATUS on file at the skilled nursing facility    Late entry         TESSA Chacko.

## 2020-05-14 NOTE — PROGRESS NOTES
Nursing Home Follow Up Note      Tony Marc DO []   TESSA Bojorquez [x]  [address] [facility], [place] [address]  Phone: [phone]  Fax: [phone] Edvin Uaglde MD []    Tyrone Ratliff DO []   [address] [place] [address]  Phone: [phone]  Fax: [phone]     PATIENT NAME: Jaleesa Marinelli                                                                          YOB: 1929           DATE OF SERVICE: 5/12/2020  FACILITY:  [][facility]   [] [place]   [] [facility]   [x] [facility]   [] Other ______________________________________________________________________      CHIEF COMPLAINT:  Follow up decreased appetite, weight loss    HISTORY OF PRESENT ILLNESS:    Per nursing, patient is still not eating good, even with starting Marinol. She has lost 4 pounds more pounds. Family is aware.    Coordination of LTC needs and chronic conditions as listed below:    PAST MEDICAL & SURGICAL HISTORY:   Past Medical History:   Diagnosis Date   • Atrial fibrillation (CMS/HCC)    • COPD (chronic obstructive pulmonary disease) (CMS/HCC)    • Hypertension       Past Surgical History:   Procedure Laterality Date   • HYSTERECTOMY           MEDICATIONS:  I have reviewed and reconciled the patients medication list in the patients chart at the skilled nursing facility today.      ALLERGIES:    Allergies   Allergen Reactions   • Macrobid [Nitrofurantoin Macrocrystal] Unknown (See Comments)     unknown         SOCIAL HISTORY:    Social History     Socioeconomic History   • Marital status:      Spouse name: Not on file   • Number of children: Not on file   • Years of education: Not on file   • Highest education level: Not on file   Tobacco Use   • Smoking status: Never Smoker   • Smokeless tobacco: Never Used   Substance and Sexual Activity   • Alcohol use: No   • Drug use: No       FAMILY HISTORY:    No family history on file.    REVIEW OF SYSTEMS:    Review of Systems   Constitutional: Positive  for appetite change and unexpected weight loss. Negative for activity change, chills, diaphoresis, fatigue, fever and unexpected weight gain.   HENT: Negative for congestion, ear pain, mouth sores, nosebleeds, postnasal drip, rhinorrhea, sinus pressure, sneezing, sore throat, swollen glands and trouble swallowing.    Eyes: Negative for blurred vision, pain, discharge, redness, itching and visual disturbance.   Respiratory: Negative for apnea, cough, choking, chest tightness, shortness of breath, wheezing and stridor.    Cardiovascular: Negative for chest pain, palpitations and leg swelling.   Gastrointestinal: Negative for abdominal distention, abdominal pain, blood in stool, constipation, diarrhea, nausea, vomiting, GERD and indigestion.   Endocrine: Negative for polydipsia and polyuria.   Genitourinary: Negative for decreased urine volume, difficulty urinating, dysuria, flank pain, frequency, hematuria, urgency and urinary incontinence.   Musculoskeletal: Positive for arthralgias and gait problem. Negative for back pain, joint swelling and myalgias.   Skin: Negative for color change, dry skin, rash and skin lesions.   Allergic/Immunologic: Negative for environmental allergies.   Neurological: Positive for weakness, memory problem and confusion. Negative for dizziness, seizures and speech difficulty.   Psychiatric/Behavioral: Positive for behavioral problems. Negative for dysphoric mood, hallucinations, sleep disturbance and depressed mood. The patient is not nervous/anxious.          PHYSICAL EXAMINATION:   VITAL SIGNS:   Vitals:    05/14/20 0829   BP: 120/70   Pulse: 87   Resp: 18   Temp: 98.2 °F (36.8 °C)   SpO2: 97%   Weight: 48.5 kg (107 lb)       Physical Exam   Constitutional: She appears well-developed and well-nourished.   HENT:   Head: Normocephalic.   Right Ear: External ear normal.   Left Ear: External ear normal.   Nose: Nose normal.   Eyes: Conjunctivae are normal.   Neck: Normal range of motion.    Cardiovascular: Normal rate, regular rhythm and intact distal pulses.   Pulmonary/Chest: Effort normal and breath sounds normal. No respiratory distress. She has no wheezes. She has no rales.   Abdominal: Soft. Bowel sounds are normal. She exhibits no distension and no mass. There is no tenderness. No hernia.   Musculoskeletal: She exhibits no edema or tenderness.   Neurological: She is alert. She is disoriented.   Skin: Skin is warm and dry. No rash noted.   Psychiatric: She has a normal mood and affect. She is agitated (yells out at times in the evening but this is baseline for her). Cognition and memory are impaired.   Nursing note and vitals reviewed.      RECORDS REVIEW:   I have reviewed and interpreted the most recent labs at today's visit    Advance Care Planning   ACP discussion was declined by the patient. Patient has an advance directive in EMR which is still valid.     ASSESSMENT     Diagnoses and all orders for this visit:    Decreased appetite    Weight loss    Vascular dementia with behavior disturbance (CMS/HCC)    Impaired mobility and ADLs    Age-related physical debility        PLAN    Dementia/Mood disorder/wieght loss/decreased appetite  -She is not having any worsening behaviors  but continues to have decreased appetite and weight loss. Nursing also giving fortified oats with each meal, as well as med pass with meals and frequent snacks. Increase Marinol to 5 mg bid with lunch and dinner.  She is weighed weekly. Will monitor. Family is aware.    Staff to continue supportive care for all ADLs.     Staff encouraged to keep me informed of any acute changes, lack of improvement, or any new concerning symptoms.      [x]  Discussed Patient in detail with nursing/staff, addressed all needs today.     [x]  Plan of Care Reviewed   []  PT/OT Reviewed   [x]  Order Changes  []  Discharge Plans Reviewed  [x]  Advance Directive on file with Nursing Home.   [x]  POA on file with Nursing Home.   [x]  Code  Status listed: []  Full Code   [x]  DNR     “I confirm accuracy of unchanged data/findings which have been carried forward from previous visit, as well as I have updated appropriately those that have changed.”            Dominique Oliveira, APRN.

## 2020-06-08 NOTE — PROGRESS NOTES
Nursing Home Follow Up Note      Tony Marc DO []   TESSA Bojorquez [x]  [address] [facility], [place] [address]  Phone: [phone]  Fax: [phone] Edvin Ugalde MD []    Tyrone Ratliff DO []   [address] [place] [address]  Phone: [phone]  Fax: [phone]     PATIENT NAME: Jaleesa Marinelli                                                                          YOB: 1929           DATE OF SERVICE: 6/5/2020  FACILITY:  [][facility]   [] [place]   [] [facility]   [x] [facility]   [] Other ______________________________________________________________________      CHIEF COMPLAINT:  Follow up decreased appetite, weight loss    HISTORY OF PRESENT ILLNESS:    Per nursing, patient is still not eating well, with increasing Marinol to 5 mg. She has eaten better at times, and per documented weight from last visit, on 5/12, she has gained 5 pounds. She continues to be lethargic and just want to sleep, most of the day.    Coordination of LTC needs and chronic conditions as listed below:    PAST MEDICAL & SURGICAL HISTORY:   Past Medical History:   Diagnosis Date   • Atrial fibrillation (CMS/HCC)    • COPD (chronic obstructive pulmonary disease) (CMS/HCC)    • Hypertension       Past Surgical History:   Procedure Laterality Date   • HYSTERECTOMY           MEDICATIONS:  I have reviewed and reconciled the patients medication list in the patients chart at the skilled nursing facility today.      ALLERGIES:    Allergies   Allergen Reactions   • Macrobid [Nitrofurantoin Macrocrystal] Unknown (See Comments)     unknown         SOCIAL HISTORY:    Social History     Socioeconomic History   • Marital status:      Spouse name: Not on file   • Number of children: Not on file   • Years of education: Not on file   • Highest education level: Not on file   Tobacco Use   • Smoking status: Never Smoker   • Smokeless tobacco: Never Used   Substance and Sexual Activity   • Alcohol use: No   •  Drug use: No       FAMILY HISTORY:    No family history on file.    REVIEW OF SYSTEMS:    Review of Systems   Reason unable to perform ROS: ROS per nursing.   Constitutional: Positive for appetite change (decreased appetite). Negative for activity change, chills, diaphoresis, fatigue, fever, unexpected weight gain and unexpected weight loss.   HENT: Negative for congestion, mouth sores, nosebleeds, postnasal drip, rhinorrhea, sneezing, sore throat, swollen glands and trouble swallowing.    Eyes: Negative for discharge, redness and itching.   Respiratory: Negative for apnea, cough, choking, chest tightness, shortness of breath, wheezing and stridor.    Cardiovascular: Negative for palpitations and leg swelling.   Gastrointestinal: Negative for abdominal distention, abdominal pain, blood in stool, constipation, diarrhea, nausea and vomiting.   Endocrine: Negative for polydipsia and polyuria.   Genitourinary: Positive for urinary incontinence. Negative for decreased urine volume, difficulty urinating, frequency, hematuria and urgency.   Musculoskeletal: Positive for arthralgias and gait problem. Negative for back pain, joint swelling and myalgias.   Skin: Negative for color change, dry skin, rash and skin lesions.   Allergic/Immunologic: Negative for environmental allergies.   Neurological: Positive for weakness, memory problem and confusion. Negative for dizziness, seizures and speech difficulty.   Psychiatric/Behavioral: Positive for behavioral problems. Negative for dysphoric mood, hallucinations, sleep disturbance and depressed mood. The patient is not nervous/anxious.          PHYSICAL EXAMINATION:   VITAL SIGNS:   Vitals:    06/05/20 1120   BP: 126/84   Pulse: 78   Resp: 18   Temp: 97.4 °F (36.3 °C)   SpO2: 96%   Weight: 50.8 kg (112 lb)       Physical Exam   Constitutional: She appears well-developed and well-nourished.   HENT:   Head: Normocephalic.   Right Ear: External ear normal.   Left Ear: External ear  normal.   Nose: Nose normal.   Eyes: Conjunctivae are normal.   Neck: Normal range of motion.   Cardiovascular: Normal rate, regular rhythm and intact distal pulses.   Pulmonary/Chest: Effort normal and breath sounds normal. No respiratory distress. She has no wheezes. She has no rales.   Abdominal: Soft. Bowel sounds are normal. She exhibits no distension and no mass. There is no tenderness. No hernia.   Musculoskeletal: She exhibits no edema or tenderness.   Neurological: She is alert. She is disoriented.   Skin: Skin is warm and dry. No rash noted.   Psychiatric: She has a normal mood and affect. She is agitated (yells out at times in the evening but this is baseline for her). Cognition and memory are impaired.   Nursing note and vitals reviewed.      RECORDS REVIEW:   I have reviewed and interpreted the most recent labs at today's visit    Advance Care Planning   ACP discussion was declined by the patient. Patient has an advance directive in EMR which is still valid.     ASSESSMENT     Diagnoses and all orders for this visit:    Decreased appetite    Vascular dementia with behavior disturbance (CMS/HCC)    Impaired mobility and ADLs    Age-related physical debility        PLAN    Decreased appetite/Dementia  -She is not having any worsening behaviors  but continues to have decreased appetite. Her weight is remaining stable though. Nursing to discuss with staff, changing appetite stimulant to Megace, though there is increased risk of blood clot. Nursing also to discuss with family if they plan to have PEG placed, if her appetite continues to decrease. Nursing also giving fortified oats with each meal, as well as med pass with meals and frequent snacks.   She is weighed weekly. Will monitor.     Staff to continue supportive care for all ADLs.     Staff encouraged to keep me informed of any acute changes, lack of improvement, or any new concerning symptoms.      [x]  Discussed Patient in detail with nursing/staff,  addressed all needs today.     [x]  Plan of Care Reviewed   []  PT/OT Reviewed   [x]  Order Changes  []  Discharge Plans Reviewed  [x]  Advance Directive on file with Nursing Home.   [x]  POA on file with Nursing Home.   [x]  Code Status listed: []  Full Code   [x]  DNR     “I confirm accuracy of unchanged data/findings which have been carried forward from previous visit, as well as I have updated appropriately those that have changed.”              Dominique Oliveira, APRN.

## 2020-06-11 NOTE — PROGRESS NOTES
Nursing Home Follow Up Note      Tony Marc DO []   TESSA Bojorquez [x]  [address] [place] [address]  Phone: [phone]  Fax: [phone] Edvin Ugalde MD []    Tyrone Ratliff DO []   [address] [place] [address]  Phone: [phone]  Fax: [phone]     PATIENT NAME: Jaleesa Marinelli                                                                          YOB: 1929           DATE OF SERVICE: 6/10/2020  FACILITY:  [][place]   [] [place]   [] [facility]   [x] [facility]   [] Other ______________________________________________________________________      CHIEF COMPLAINT:  Follow up decreased appetite, weight loss    Vaginal discharge    HISTORY OF PRESENT ILLNESS:    Per nursing, patient continues to eat poorly, and has been eating even less, the past week or so, despite stimulants. She had lost 2 pounds, when weighed yesterday. Family was notified, and did not want tube feeding, and wanted Hospice consulted. Hospice admitted her to their service yesterday.    Nurse reports that vaginal discharge was noted today, when changing her depends. It was green in color, and a large amount.     Coordination of LTC needs and chronic conditions as listed below:    PAST MEDICAL & SURGICAL HISTORY:   Past Medical History:   Diagnosis Date   • Atrial fibrillation (CMS/HCC)    • COPD (chronic obstructive pulmonary disease) (CMS/HCC)    • Hypertension       Past Surgical History:   Procedure Laterality Date   • HYSTERECTOMY           MEDICATIONS:  I have reviewed and reconciled the patients medication list in the patients chart at the skilled nursing facility today.      ALLERGIES:    Allergies   Allergen Reactions   • Macrobid [Nitrofurantoin Macrocrystal] Unknown (See Comments)     unknown         SOCIAL HISTORY:    Social History     Socioeconomic History   • Marital status:      Spouse name: Not on file   • Number of children: Not on file   • Years of education: Not on  file   • Highest education level: Not on file   Tobacco Use   • Smoking status: Never Smoker   • Smokeless tobacco: Never Used   Substance and Sexual Activity   • Alcohol use: No   • Drug use: No       FAMILY HISTORY:    No family history on file.    REVIEW OF SYSTEMS:    Review of Systems   Reason unable to perform ROS: ROS per nursing.   Constitutional: Positive for appetite change (decreased appetite). Negative for activity change, chills, diaphoresis, fatigue, fever, unexpected weight gain and unexpected weight loss.   HENT: Negative for congestion, mouth sores, nosebleeds, postnasal drip, rhinorrhea, sneezing, sore throat, swollen glands and trouble swallowing.    Eyes: Negative for discharge, redness and itching.   Respiratory: Negative for cough, choking, chest tightness, shortness of breath, wheezing and stridor.    Cardiovascular: Negative for palpitations and leg swelling.   Gastrointestinal: Negative for abdominal distention, abdominal pain, blood in stool, constipation, diarrhea, nausea and vomiting.   Endocrine: Negative for polydipsia and polyuria.   Genitourinary: Positive for urinary incontinence and vaginal discharge. Negative for decreased urine volume, difficulty urinating, frequency, hematuria and urgency.   Musculoskeletal: Positive for arthralgias and gait problem. Negative for back pain, joint swelling and myalgias.   Skin: Negative for color change, dry skin, rash and skin lesions.   Allergic/Immunologic: Negative for environmental allergies.   Neurological: Positive for speech difficulty, weakness, memory problem and confusion. Negative for seizures.   Psychiatric/Behavioral: Positive for behavioral problems and dysphoric mood. Negative for hallucinations, sleep disturbance and depressed mood. The patient is not nervous/anxious.          PHYSICAL EXAMINATION:   VITAL SIGNS:   Vitals:    06/10/20 1319   BP: 116/60   Pulse: 62   Resp: 18   Temp: 97.3 °F (36.3 °C)   SpO2: 96%   Weight: 49.9 kg  (110 lb)       Physical Exam   Constitutional: She appears well-developed and well-nourished.   HENT:   Head: Normocephalic.   Right Ear: External ear normal.   Left Ear: External ear normal.   Nose: Nose normal.   Eyes: Conjunctivae are normal.   Neck: Normal range of motion.   Cardiovascular: Normal rate, regular rhythm and intact distal pulses.   Pulmonary/Chest: Effort normal and breath sounds normal. No respiratory distress. She has no wheezes. She has no rales.   Abdominal: Soft. Bowel sounds are normal. She exhibits no distension and no mass. There is no tenderness. No hernia.   Genitourinary: Vaginal discharge (per nursing) found.   Musculoskeletal: She exhibits no edema or tenderness.   Neurological: She is alert. She is disoriented.   Skin: Skin is warm and dry. No rash noted.   Psychiatric: She has a normal mood and affect. She is agitated (yells out at times in the evening but this is baseline for her). Cognition and memory are impaired.   Nursing note and vitals reviewed.      RECORDS REVIEW:   I have reviewed and interpreted the most recent labs at today's visit        ASSESSMENT     Diagnoses and all orders for this visit:    Declining functional status    Decreased appetite    Weight loss    Vascular dementia with behavior disturbance (CMS/[facility])    Hospice care patient    Impaired mobility and ADLs    Age-related physical debility        PLAN    Decreased appetite/weight loss/Dementia/declining status/Hospice   -She continues have decreased appetite, and has lost 2 pounds in the past week. She continues to decline, and Hospice admitted her to their service yesterday, per family request.     Staff to continue supportive care for all ADLs.     Staff encouraged to keep me informed of any acute changes,  or any new concerning symptoms.      [x]  Discussed Patient in detail with nursing/staff, addressed all needs today.     [x]  Plan of Care Reviewed   []  PT/OT Reviewed   [x]  Order Changes  []  Discharge  Plans Reviewed  [x]  Advance Directive on file with Nursing Home.   [x]  POA on file with Nursing Home.   [x]  Code Status listed: []  Full Code   [x]  DNR       “I confirm accuracy of unchanged data/findings which have been carried forward from previous visit, as well as I have updated appropriately those that have changed.”            Dominique Oliveira, APRN.

## 2020-06-25 PROBLEM — Z51.5 HOSPICE CARE PATIENT: Status: ACTIVE | Noted: 2020-01-01

## 2020-06-25 NOTE — PROGRESS NOTES
Nursing Home Progress Note        Tony Marc DO [x]  TESSA Bojorquez []  [address] [facility] [facility], [place] [address]  Phone: [phone]  Fax: [phone] Edvin Ugalde MD []  Tyrone Ratliff DO []  [address] [place] [address]  Phone: [phone]  Fax: [phone]     PATIENT NAME: Jaleesa Marinelli                                                                          YOB: 1929           DATE OF SERVICE: 6/24/2020  FACILITY: []  [place]  [] [place]  []  [facility]  [x] [facility]  []  Other ______________________________________________________________________     CHIEF COMPLAINT:  Chronic medical management and long-term care/hospice care      HISTORY OF PRESENT ILLNESS:   [x]  Follow Up visit for coordination of long term care issues and chronic medical management of Diagnoses and all orders for this visit:    Impaired mobility and ADLs    Age-related physical debility    Essential hypertension    Chronic atrial fibrillation (CMS/HCC)    Chronic obstructive bronchitis (CMS/HCC)    Acquired hypothyroidism    Vascular dementia with behavior disturbance (CMS/[facility])    Hospice care patient    Pressure injury of left buttock, stage 2 (CMS/HCC)    Patient continues to receive the entirety of supportive care for mobilization/transfers, as well as ADLs.  She continues to have a gradual/global decline.  This is of expected nature.    Vital signs have demonstrated relative hemodynamic stability, however her activity level is minimal.    No reports of any focal aspiration since last visit.  She is required no increased respiratory care thus far.    No acute behavioral changes, but has an expected gradual decline with her responsiveness.    She has developed a stage II left buttocks wound of unavoidable nature.  No defined trauma.      PAST MEDICAL & SURGICAL HISTORY:   Past Medical History:   Diagnosis Date   • Atrial fibrillation (CMS/HCC)    • COPD (chronic obstructive pulmonary  disease) (CMS/[facility])    • Hypertension       Past Surgical History:   Procedure Laterality Date   • HYSTERECTOMY           MEDICATIONS:  I have reviewed and reconciled the patients medication list in the patients chart at the skilled nursing facility today.      ALLERGIES:    Allergies   Allergen Reactions   • Macrobid [Nitrofurantoin Macrocrystal] Unknown (See Comments)     unknown         SOCIAL HISTORY:    Social History     Socioeconomic History   • Marital status:      Spouse name: Not on file   • Number of children: Not on file   • Years of education: Not on file   • Highest education level: Not on file   Tobacco Use   • Smoking status: Never Smoker   • Smokeless tobacco: Never Used   Substance and Sexual Activity   • Alcohol use: No   • Drug use: No       FAMILY HISTORY:    No family history on file.    REVIEW OF SYSTEMS:    Review of Systems  · Appetite: Fair [x]   Good []   Poor []   Weight Loss []  []  Weight Stable   Unavoidable Weight Loss [x]  Tolerating Tube Feeding []    Supplements Provided []   Due to advanced dementia, patient is unable to fully participate in review of systems.  These answers are obtained on direct consultation with patient's treating nurse, staff members and review of her medical records and notes.    PHYSICAL EXAMINATION:   VITAL SIGNS:   Vitals:    06/24/20 1307   BP: 116/76   Pulse: 98   Resp: 16   Temp: 97.8 °F (36.6 °C)   SpO2: 94%       Physical Exam    General Appearance:  [x]  Alert   []  Oriented x person  [x]  No acute distress     [x]  Confused  [x]  Disoriented, at times  []  Comatose   Head:  Atraumatic and normocephalic, without obvious abnormality.   Eyes:         PERRLA, conjunctivae and sclerae normal, no Icterus. No pallor. Extra-occular movements are within normal limits.   Ears:  Ears appear intact with no abnormalities noted.   Throat: No oral lesions, no thrush, oral mucosa slightly dry, tacky.   Neck: Supple, trachea midline, no thyromegaly, no carotid  bruit.   Back:   No kyphoscoliosis. No tenderness to palpation.   Lungs:   Chest shape is normal. Breath sounds heard bilaterally equally.  No wheezing.    Rhonchus referred upper airway congestion.    Heart:  Normal S1 and S2, no murmur, no gallop, no rub. No JVD.   Abdomen:   Normal bowel sounds, no masses, no organomegaly. Soft, non-tender, non-distended, no guarding    Extremities: Moves all extremities.  Without edema, cyanosis or clubbing.  Frail build.   Poor core strength and stability.   Pulses: Pulses palpable and equal bilaterally.   Skin:  Stage II to the left buttocks.  Generalized dry skin noted.  Age-related atrophy of skin.   Neurologic: [] Normal speech []  Normal mental status    [x] Cranial nerves II through XII intact   [x]  No anosmia [x]  DTR 2+ [x]  Proprioception intact  [x]  No focal motor/sensory deficits      Psych/Mood:                    [x]   Decreased alertness and responsiveness[]  Depressed  Urinary:                            []  Continent  [x]  Incontinent []  Retention  []  F/C      []  UTI w/treatment in progress         ASSESSMENT     Diagnoses and all orders for this visit:    1. Impaired mobility and ADLs (Primary)    2. Age-related physical debility    3. Essential hypertension    4. Chronic atrial fibrillation (CMS/HCC)    5. Chronic obstructive bronchitis (CMS/HCC)    6. Acquired hypothyroidism    7. Vascular dementia with behavior disturbance (CMS/HCC)    8. Hospice care patient    9. Pressure injury of left buttock, stage 2 (CMS/HCC)          PLAN  Plan continuation of supportive care for all transfer assistance, as well as ADLs of need.  She has experienced a progressive decline globally of expected nature, unavoidable additionally.  Continue to provide hospice care, goals of care consistent with comfort measures.    Her vital signs do demonstrate hemodynamic stability, although this is most certainly due to her impaired activity level.    She has demonstrated no  significant respiratory compromise, again her activity level is of minimal effort.    No acute behavioral changes that would warrant a drastic or significant change to her current treatment regimen.    Continue hospice services.    Continue wound care as best able to her left buttocks, again of unavoidable nature given her bedbound status and progressive functional decline.    [x]  Discussed Patient in detail with nursing/staff, addressed all needs today.     [x]  Plan of Care Reviewed   []  PT/OT Reviewed   []  Order Changes  []  Discharge Plans Reviewed   []  Code Status Changes         Tony Marc DO  6/24/2020

## 2020-07-02 NOTE — PROGRESS NOTES
Nursing Home Follow Up Note      Tony Marc DO []   TESSA Bojorquez [x]  [address] [place] [address]  Phone: [phone]  Fax: [phone] Edvin Ugalde MD []    Tyrone Ratliff DO []   [address] [place] [address]  Phone: [phone]  Fax: [phone]     PATIENT NAME: Jaleesa Marinelli                                                                          YOB: 1929           DATE OF SERVICE: 7/1/2020  FACILITY:  [][place]   [] [place]   [] [facility]   [x] [facility]   [] Other ______________________________________________________________________      CHIEF COMPLAINT:  Follow up health status    Hospice patient    HISTORY OF PRESENT ILLNESS:   Patient continues to receive the entirety of supportive care for mobilization/transfers, as well as ADLs.  She continues to have a gradual/global decline.  This is of expected nature.     Vital signs have demonstrated relative hemodynamic stability, however her activity level is minimal. She is Hospice service.      PAST MEDICAL & SURGICAL HISTORY:   Past Medical History:   Diagnosis Date   • Atrial fibrillation (CMS/HCC)    • COPD (chronic obstructive pulmonary disease) (CMS/HCC)    • Hypertension       Past Surgical History:   Procedure Laterality Date   • HYSTERECTOMY           MEDICATIONS:  I have reviewed and reconciled the patients medication list in the patients chart at the skilled nursing facility today.      ALLERGIES:    Allergies   Allergen Reactions   • Macrobid [Nitrofurantoin Macrocrystal] Unknown (See Comments)     unknown         SOCIAL HISTORY:    Social History     Socioeconomic History   • Marital status:      Spouse name: Not on file   • Number of children: Not on file   • Years of education: Not on file   • Highest education level: Not on file   Tobacco Use   • Smoking status: Never Smoker   • Smokeless tobacco: Never Used   Substance and Sexual Activity   • Alcohol use: No   • Drug use: No            FAMILY HISTORY:    No family history on file.    REVIEW OF SYSTEMS:    Unable to obtain, due to patient's advanced dementia. Per nursing staff:    Positive: Poor appetite and weight loss, weakness, fatigue  Positive: Incontinence  Negative: Skin breakdown or rash    PHYSICAL EXAMINATION:   VITAL SIGNS:   Vitals:    07/01/20 1016   BP: 122/70   Pulse: 86   Resp: 18   Temp: 97.3 °F (36.3 °C)   Weight: 49.9 kg (110 lb)       Physical Exam   Constitutional: She appears well-developed and well-nourished.   HENT:   Head: Normocephalic.   Right Ear: External ear normal.   Left Ear: External ear normal.   Nose: Nose normal.   Eyes: Conjunctivae are normal.   Neck: Normal range of motion.   Cardiovascular: Normal rate, regular rhythm and intact distal pulses.   Pulmonary/Chest: Effort normal and breath sounds normal. No respiratory distress. She has no wheezes. She has no rales.   Abdominal: Soft. Bowel sounds are normal. She exhibits no distension and no mass. There is no tenderness. No hernia.   Musculoskeletal: She exhibits no edema or tenderness.   Neurological: She is alert.   Non verbal during assessment   Skin: Skin is warm and dry. No rash noted.   Psychiatric: Her affect is inappropriate. She is withdrawn. Cognition and memory are impaired. She is noncommunicative.   Nursing note and vitals reviewed.      RECORDS REVIEW:   I have reviewed and interpreted the most recent labs at today's visit        ASSESSMENT     Diagnoses and all orders for this visit:    Declining functional status    Vascular dementia with behavior disturbance (CMS/[facility])    Hospice care patient    Impaired mobility and ADLs    Age-related physical debility        PLAN  Dementia/declining status/Hospice   -Plan continuation of supportive care for all transfer assistance, as well as ADLs of need.  She has experienced a progressive decline globally of expected nature, unavoidable additionally.  Continue to provide hospice care, goals of care  consistent with comfort measures.     Her vital signs do demonstrate hemodynamic stability, but this is likely due to her impaired activity level.    Staff encouraged to keep me informed of any acute changes,  or any new concerning symptoms.      [x]  Discussed Patient in detail with nursing/staff, addressed all needs today.     [x]  Plan of Care Reviewed   []  PT/OT Reviewed   [x]  Order Changes  []  Discharge Plans Reviewed  [x]  Advance Directive on file with Nursing Home.   [x]  POA on file with Nursing Home.   [x]  Code Status listed: []  Full Code   [x]  DNR       “I confirm accuracy of unchanged data/findings which have been carried forward from previous visit, as well as I have updated appropriately those that have changed.”              Dominique Oliveira, APRN.

## 2020-08-11 NOTE — PROGRESS NOTES
Nursing Home Follow Up Note      Tony Marc DO []   TESSA Bojorquez [x]  [address] [place] [address]  Phone: [phone]  Fax: [phone] Edvin Ugalde MD []    Tyrone Ratliff DO []   [address] [place] [address]  Phone: [phone]  Fax: [phone]     PATIENT NAME: Jaleesa Marinelli                                                                          YOB: 1929           DATE OF SERVICE: 8/10/2020  FACILITY:  [][place]   [] [place]   [] [facility]   [x] [facility]   [] Other ______________________________________________________________________      CHIEF COMPLAINT:  Dry flaky skin, left palm    HISTORY OF PRESENT ILLNESS:   Per nursing, patient has some dry, flaky skin, noted to her left palm, that was noticed yesterday. She has no complaints and this is not noted anywhere else. Patient pleasant during visit today, with no complaints.       PAST MEDICAL & SURGICAL HISTORY:   Past Medical History:   Diagnosis Date   • Atrial fibrillation (CMS/HCC)    • COPD (chronic obstructive pulmonary disease) (CMS/HCC)    • Hypertension       Past Surgical History:   Procedure Laterality Date   • HYSTERECTOMY           MEDICATIONS:  I have reviewed and reconciled the patients medication list in the patients chart at the skilled nursing facility today.      ALLERGIES:    Allergies   Allergen Reactions   • Macrobid [Nitrofurantoin Macrocrystal] Unknown (See Comments)     unknown         SOCIAL HISTORY:    Social History     Socioeconomic History   • Marital status:      Spouse name: Not on file   • Number of children: Not on file   • Years of education: Not on file   • Highest education level: Not on file   Tobacco Use   • Smoking status: Never Smoker   • Smokeless tobacco: Never Used   Substance and Sexual Activity   • Alcohol use: No   • Drug use: No       FAMILY HISTORY:    No family history on file.    REVIEW OF SYSTEMS:    Unable to obtain, due to patient's  advanced dementia. Per nursing staff:    Positive: Poor appetite and weight loss, weakness, fatigue  Positive: Incontinence  Negative: Dry scaly skin left palm    PHYSICAL EXAMINATION:   VITAL SIGNS:   Vitals:    08/10/20 1516   BP: 124/72   Pulse: 72   Resp: 18   Temp: 98 °F (36.7 °C)   SpO2: 95%   Weight: 49.9 kg (110 lb)       Physical Exam   Constitutional: She appears well-developed and well-nourished.   HENT:   Head: Normocephalic.   Right Ear: External ear normal.   Left Ear: External ear normal.   Nose: Nose normal.   Eyes: Conjunctivae are normal.   Neck: Normal range of motion.   Cardiovascular: Normal rate, regular rhythm and intact distal pulses.   Pulmonary/Chest: Effort normal and breath sounds normal. No respiratory distress. She has no wheezes. She has no rales.   Abdominal: Soft. Bowel sounds are normal. She exhibits no distension and no mass. There is no tenderness. No hernia.   Musculoskeletal: She exhibits no edema or tenderness.   Neurological: She is alert.   Non verbal during assessment   Skin: Skin is warm and dry. No rash noted.        Psychiatric: Her affect is inappropriate. She is withdrawn. Cognition and memory are impaired. She is noncommunicative.   Nursing note and vitals reviewed.      RECORDS REVIEW:   I have reviewed and interpreted the most recent labs at today's visit        ASSESSMENT     Diagnoses and all orders for this visit:    Dry skin    Impaired mobility and ADLs    Age-related physical debility        PLAN  Dry skin left palm  -Patient holds left hand left hand in a fist most of time, will not extend fingers. This is the cause of dry skin to her palm. Nursing to clean an exfoliate dry skin, and apply lotion frequently. Will c/t monitor.     Staff encouraged to keep me informed of any acute changes,  or any new concerning symptoms.      [x]  Discussed Patient in detail with nursing/staff, addressed all needs today.     [x]  Plan of Care Reviewed   []  PT/OT Reviewed   [x]   Order Changes  []  Discharge Plans Reviewed  [x]  Advance Directive on file with Nursing Home.   [x]  POA on file with Nursing Home.   [x]  Code Status listed: []  Full Code   [x]  DNR       “I confirm accuracy of unchanged data/findings which have been carried forward from previous visit, as well as I have updated appropriately those that have changed.”                Dominique Oliveira, APRN.

## 2020-08-17 NOTE — PROGRESS NOTES
Nursing Home Follow Up Note      Tony Marc DO []   TESSA Bojorquez [x]  [address] [place] [address]  Phone: [phone]  Fax: [phone] Edvin Ugalde MD []    Tyrone Ratliff DO []   [address] [place] [address]  Phone: [phone]  Fax: [phone]     PATIENT NAME: Jaleesa Marinelli                                                                          YOB: 1929           DATE OF SERVICE: 8/14/2020  FACILITY:  [][facility]   [] [place]   [] [facility]   [x] [facility]   [] Other ______________________________________________________________________      CHIEF COMPLAINT:  Dry flaky skin, left palm    HISTORY OF PRESENT ILLNESS:   Per nursing, patient continues to have some dry, flaky skin, noted to her left palm.  Nursing staff have been cleaning her hand and applying lotion, but it is not helping. Patient pleasant during visit today, with no complaints.       PAST MEDICAL & SURGICAL HISTORY:   Past Medical History:   Diagnosis Date   • Atrial fibrillation (CMS/HCC)    • COPD (chronic obstructive pulmonary disease) (CMS/HCC)    • Hypertension       Past Surgical History:   Procedure Laterality Date   • HYSTERECTOMY           MEDICATIONS:  I have reviewed and reconciled the patients medication list in the patients chart at the skilled nursing facility today.      ALLERGIES:    Allergies   Allergen Reactions   • Macrobid [Nitrofurantoin Macrocrystal] Unknown (See Comments)     unknown         SOCIAL HISTORY:    Social History     Socioeconomic History   • Marital status:      Spouse name: Not on file   • Number of children: Not on file   • Years of education: Not on file   • Highest education level: Not on file   Tobacco Use   • Smoking status: Never Smoker   • Smokeless tobacco: Never Used   Substance and Sexual Activity   • Alcohol use: No   • Drug use: No       FAMILY HISTORY:    No family history on file.    REVIEW OF SYSTEMS:    Unable to obtain, due to  patient's advanced dementia. Per nursing staff:    Positive: Poor appetite and weight loss, weakness, fatigue  Positive: Incontinence  Negative: Dry scaly skin left palm    PHYSICAL EXAMINATION:   VITAL SIGNS:   Vitals:    08/14/20 1501   BP: 124/72   Pulse: 72   Resp: 18   Temp: 98 °F (36.7 °C)   SpO2: 95%   Weight: 49.9 kg (110 lb)       Physical Exam   Constitutional: She appears well-developed and well-nourished.   HENT:   Head: Normocephalic.   Right Ear: External ear normal.   Left Ear: External ear normal.   Nose: Nose normal.   Eyes: Conjunctivae are normal.   Neck: Normal range of motion.   Cardiovascular: Normal rate, regular rhythm and intact distal pulses.   Pulmonary/Chest: Effort normal and breath sounds normal. No respiratory distress. She has no wheezes. She has no rales.   Abdominal: Soft. Bowel sounds are normal. She exhibits no distension and no mass. There is no tenderness. No hernia.   Musculoskeletal: She exhibits no edema or tenderness.   Neurological: She is alert.   Non verbal during assessment   Skin: Skin is warm and dry. No rash noted.        Psychiatric: Her affect is inappropriate. She is withdrawn. Cognition and memory are impaired. She is noncommunicative.   Nursing note and vitals reviewed.      RECORDS REVIEW:   I have reviewed and interpreted the most recent labs at today's visit        ASSESSMENT     Diagnoses and all orders for this visit:    Tinea    Dry skin    Impaired mobility and ADLs        PLAN  Tinea/Dry skin left palm  -Patient holds left hand left hand in a fist most of time, will not extend fingers. . Nursing to clean an exfoliate dry skin, daily, and apply Ketoconazole cream bid x 14 days.  Will c/t monitor.     Staff encouraged to keep me informed of any acute changes,  or any new concerning symptoms.    Staff to continue supportive care for all ADLs.     [x]  Discussed Patient in detail with nursing/staff, addressed all needs today.     [x]  Plan of Care Reviewed    []  PT/OT Reviewed   [x]  Order Changes  []  Discharge Plans Reviewed  [x]  Advance Directive on file with Nursing Home.   [x]  POA on file with Nursing Home.   [x]  Code Status listed: []  Full Code   [x]  DNR         “I confirm accuracy of unchanged data/findings which have been carried forward from previous visit, as well as I have updated appropriately those that have changed.”                  Dominique Oliveira, APRN.

## 2020-08-26 NOTE — PROGRESS NOTES
Nursing Home Progress Note        Tony Marc DO [x]  TESSA Bojorquez []  [address] [place] [address]  Phone: [phone]  Fax: [phone] Edvin Ugalde MD []  Tyrone Ratliff DO []  [address] [place] [address]  Phone: [phone]  Fax: [phone]     PATIENT NAME: Jaleesa Marinelli                                                                          YOB: 1929           DATE OF SERVICE: 8/26/2020  FACILITY: []  [place]  [] [place]  []  [facility]  [x] [facility]  []  Other ______________________________________________________________________     CHIEF COMPLAINT:  Chronic medical management and long-term care/hospice care      HISTORY OF PRESENT ILLNESS:   [x]  Follow Up visit for coordination of long term care issues and chronic medical management of Diagnoses and all orders for this visit:    Impaired mobility and ADLs    Age-related physical debility    Hospice care patient    Vascular dementia with behavior disturbance (CMS/HCC)    Chronic obstructive bronchitis (CMS/HCC)    Chronic atrial fibrillation (CMS/HCC)    Essential hypertension    Acquired hypothyroidism    Staff reports patient remains responsive to assistance with transfers.  ADL assistance continues to be needed, patient is even demonstrated appreciation for simple care such as changing.    She has tolerated p.o. intake.  Hospice continues to follow patient additionally.    No acute behavioral changes of note.    No reports of any respiratory compromise, no reports of aspiration.    Vital signs have demonstrated relative stability.      PAST MEDICAL & SURGICAL HISTORY:   Past Medical History:   Diagnosis Date   • Atrial fibrillation (CMS/HCC)    • COPD (chronic obstructive pulmonary disease) (CMS/HCC)    • Hypertension       Past Surgical History:   Procedure Laterality Date   • HYSTERECTOMY           MEDICATIONS:  I have reviewed and reconciled the patients medication list in the patients  chart at the skilled nursing facility today.      ALLERGIES:    Allergies   Allergen Reactions   • Macrobid [Nitrofurantoin Macrocrystal] Unknown (See Comments)     unknown         SOCIAL HISTORY:    Social History     Socioeconomic History   • Marital status:      Spouse name: Not on file   • Number of children: Not on file   • Years of education: Not on file   • Highest education level: Not on file   Tobacco Use   • Smoking status: Never Smoker   • Smokeless tobacco: Never Used   Substance and Sexual Activity   • Alcohol use: No   • Drug use: No       FAMILY HISTORY:    No family history on file.    REVIEW OF SYSTEMS:    Review of Systems  · Appetite: Fair [x]   Good []   Poor []   Weight Loss []  []  Weight Stable   Unavoidable Weight Loss [x]  Tolerating Tube Feeding []    Supplements Provided []   Due to advanced dementia, patient is unable to fully participate in review of systems.  These answers are obtained on direct consultation with patient's treating nurse, staff members and review of her medical records and notes.    PHYSICAL EXAMINATION:   VITAL SIGNS:   Vitals:    08/26/20 1110   BP: 126/70   Pulse: 72   Resp: 18   Temp: 97.8 °F (36.6 °C)   SpO2: 96%       Physical Exam    General Appearance:  [x]  Alert   []  Oriented x person  [x]  No acute distress     [x]  Confused  [x]  Disoriented, at times  []  Comatose   Head:  Atraumatic and normocephalic, without obvious abnormality.   Eyes:         PERRLA, conjunctivae and sclerae normal, no Icterus. No pallor. Extra-occular movements are within normal limits.   Ears:  Ears appear intact with no abnormalities noted.   Throat: No oral lesions, no thrush, oral mucosa slightly dry, tacky.   Neck: Supple, trachea midline, no thyromegaly, no carotid bruit.   Back:   No kyphoscoliosis. No tenderness to palpation.   Lungs:   Chest shape is normal. Breath sounds heard bilaterally equally.  No wheezing.    Rhonchus referred upper airway congestion.    Heart:   Normal S1 and S2, no murmur, no gallop, no rub. No JVD.   Abdomen:   Normal bowel sounds, no masses, no organomegaly. Soft, non-tender, non-distended, no guarding    Extremities: Moves all extremities.  Without edema, cyanosis or clubbing.  Frail build.   Poor core strength and stability.   Pulses: Pulses palpable and equal bilaterally.   Skin:  Stage II to the left buttocks.  Generalized dry skin noted.  Age-related atrophy of skin.   Neurologic: [] Normal speech []  Normal mental status    [x] Cranial nerves II through XII intact   [x]  No anosmia [x]  DTR 2+ [x]  Proprioception intact  [x]  No focal motor/sensory deficits      Psych/Mood:                    [x]   Decreased alertness and responsiveness[]  Depressed  Urinary:                            []  Continent  [x]  Incontinent []  Retention  []  F/C      []  UTI w/treatment in progress         ASSESSMENT     Diagnoses and all orders for this visit:    1. Impaired mobility and ADLs (Primary)    2. Age-related physical debility    3. Hospice care patient    4. Vascular dementia with behavior disturbance (CMS/HCC)    5. Chronic obstructive bronchitis (CMS/HCC)    6. Chronic atrial fibrillation (CMS/HCC)    7. Essential hypertension    8. Acquired hypothyroidism          PLAN  Continue supportive care, including hospice, with regards to her overall goals of care.  She appears to be in no way in uncontrolled pain, and without acute behavioral change at this time.    Continue aspiration precautions.    No acute behavioral changes noted, although she does continue to decline from a global perspective with respect to her cognitive function as expected due to her advancing vascular dementia.    Vital signs do demonstrate hemodynamic stability, although low cardiac output given her bedridden state.  Continue to follow clinically.    [x]  Discussed Patient in detail with nursing/staff, addressed all needs today.     [x]  Plan of Care Reviewed   []  PT/OT Reviewed   []   Order Changes  []  Discharge Plans Reviewed   []  Code Status Changes    I have reviewed and updated all copied forward information, as appropriate.  I attest to the accuracy and relevance of any unchanged information.       Tony Marc DO  8/26/2020

## 2020-09-03 NOTE — TELEPHONE ENCOUNTER
“Please be informed that patient has passed. Patient has been marked  in the system. The date of death is: 2020 7:30 AM    Caller: [facility]    Relationship:  NURSE  Best call back number: [phone]